# Patient Record
Sex: FEMALE | Race: BLACK OR AFRICAN AMERICAN | NOT HISPANIC OR LATINO
[De-identification: names, ages, dates, MRNs, and addresses within clinical notes are randomized per-mention and may not be internally consistent; named-entity substitution may affect disease eponyms.]

---

## 2017-02-08 ENCOUNTER — APPOINTMENT (OUTPATIENT)
Dept: ENDOCRINOLOGY | Facility: CLINIC | Age: 62
End: 2017-02-08

## 2017-02-08 VITALS
DIASTOLIC BLOOD PRESSURE: 80 MMHG | BODY MASS INDEX: 40.33 KG/M2 | SYSTOLIC BLOOD PRESSURE: 120 MMHG | WEIGHT: 260 LBS | HEIGHT: 67.5 IN | OXYGEN SATURATION: 98 % | HEART RATE: 67 BPM

## 2017-02-08 LAB — GLUCOSE BLDC GLUCOMTR-MCNC: 99

## 2017-03-27 ENCOUNTER — RX RENEWAL (OUTPATIENT)
Age: 62
End: 2017-03-27

## 2017-04-20 ENCOUNTER — RX RENEWAL (OUTPATIENT)
Age: 62
End: 2017-04-20

## 2017-07-03 ENCOUNTER — MEDICATION RENEWAL (OUTPATIENT)
Age: 62
End: 2017-07-03

## 2017-07-05 ENCOUNTER — APPOINTMENT (OUTPATIENT)
Dept: ENDOCRINOLOGY | Facility: CLINIC | Age: 62
End: 2017-07-05

## 2017-07-05 VITALS
HEIGHT: 67.5 IN | HEART RATE: 70 BPM | BODY MASS INDEX: 40.02 KG/M2 | WEIGHT: 258 LBS | SYSTOLIC BLOOD PRESSURE: 120 MMHG | OXYGEN SATURATION: 98 % | DIASTOLIC BLOOD PRESSURE: 80 MMHG

## 2017-07-05 LAB
GLUCOSE BLDC GLUCOMTR-MCNC: 138
HBA1C MFR BLD HPLC: 6.9

## 2017-07-06 LAB
25(OH)D3 SERPL-MCNC: 27.6 NG/ML
ALBUMIN SERPL ELPH-MCNC: 4.5 G/DL
ALP BLD-CCNC: 67 U/L
ALT SERPL-CCNC: 55 U/L
ANION GAP SERPL CALC-SCNC: 13 MMOL/L
AST SERPL-CCNC: 36 U/L
BILIRUB SERPL-MCNC: 0.2 MG/DL
BUN SERPL-MCNC: 13 MG/DL
CALCIUM SERPL-MCNC: 9.3 MG/DL
CHLORIDE SERPL-SCNC: 105 MMOL/L
CHOLEST SERPL-MCNC: 188 MG/DL
CHOLEST/HDLC SERPL: 3.4 RATIO
CO2 SERPL-SCNC: 23 MMOL/L
CREAT SERPL-MCNC: 0.92 MG/DL
CREAT SPEC-SCNC: 138 MG/DL
GLUCOSE SERPL-MCNC: 101 MG/DL
HDLC SERPL-MCNC: 56 MG/DL
LDLC SERPL CALC-MCNC: 90 MG/DL
MICROALBUMIN 24H UR DL<=1MG/L-MCNC: 0.6 MG/DL
MICROALBUMIN/CREAT 24H UR-RTO: 4 MG/G
POTASSIUM SERPL-SCNC: 4.5 MMOL/L
PROT SERPL-MCNC: 7.5 G/DL
SODIUM SERPL-SCNC: 141 MMOL/L
TRIGL SERPL-MCNC: 211 MG/DL
TSH SERPL-ACNC: 0.96 UIU/ML

## 2017-07-12 ENCOUNTER — MEDICATION RENEWAL (OUTPATIENT)
Age: 62
End: 2017-07-12

## 2017-07-18 ENCOUNTER — MEDICATION RENEWAL (OUTPATIENT)
Age: 62
End: 2017-07-18

## 2017-11-16 ENCOUNTER — MEDICATION RENEWAL (OUTPATIENT)
Age: 62
End: 2017-11-16

## 2017-11-17 ENCOUNTER — APPOINTMENT (OUTPATIENT)
Dept: ENDOCRINOLOGY | Facility: CLINIC | Age: 62
End: 2017-11-17
Payer: COMMERCIAL

## 2017-11-17 DIAGNOSIS — Z23 ENCOUNTER FOR IMMUNIZATION: ICD-10-CM

## 2017-11-17 PROCEDURE — 82962 GLUCOSE BLOOD TEST: CPT

## 2017-11-17 PROCEDURE — 83036 HEMOGLOBIN GLYCOSYLATED A1C: CPT | Mod: QW

## 2017-11-17 PROCEDURE — 90686 IIV4 VACC NO PRSV 0.5 ML IM: CPT

## 2017-11-17 PROCEDURE — 99213 OFFICE O/P EST LOW 20 MIN: CPT | Mod: 25

## 2017-11-17 PROCEDURE — G0008: CPT

## 2017-11-18 LAB
GLUCOSE BLDC GLUCOMTR-MCNC: 88
HBA1C MFR BLD HPLC: 6.6

## 2017-12-21 ENCOUNTER — RX RENEWAL (OUTPATIENT)
Age: 62
End: 2017-12-21

## 2017-12-24 ENCOUNTER — RX RENEWAL (OUTPATIENT)
Age: 62
End: 2017-12-24

## 2018-04-20 ENCOUNTER — APPOINTMENT (OUTPATIENT)
Dept: ENDOCRINOLOGY | Facility: CLINIC | Age: 63
End: 2018-04-20
Payer: COMMERCIAL

## 2018-04-20 VITALS
SYSTOLIC BLOOD PRESSURE: 120 MMHG | WEIGHT: 258 LBS | OXYGEN SATURATION: 98 % | HEART RATE: 62 BPM | BODY MASS INDEX: 40.02 KG/M2 | DIASTOLIC BLOOD PRESSURE: 80 MMHG | HEIGHT: 67.5 IN

## 2018-04-20 LAB
CREAT SPEC-SCNC: 35 MG/DL
HBA1C MFR BLD HPLC: 7.1
MICROALBUMIN 24H UR DL<=1MG/L-MCNC: <0.3 MG/DL
MICROALBUMIN/CREAT 24H UR-RTO: NORMAL

## 2018-04-20 PROCEDURE — 99213 OFFICE O/P EST LOW 20 MIN: CPT | Mod: 25

## 2018-04-20 PROCEDURE — 83036 HEMOGLOBIN GLYCOSYLATED A1C: CPT | Mod: QW

## 2018-05-17 ENCOUNTER — MEDICATION RENEWAL (OUTPATIENT)
Age: 63
End: 2018-05-17

## 2018-05-17 RX ORDER — BLOOD-GLUCOSE METER
EACH MISCELLANEOUS
Qty: 900 | Refills: 3 | Status: DISCONTINUED | COMMUNITY
Start: 2018-05-17 | End: 2018-05-17

## 2018-05-21 ENCOUNTER — APPOINTMENT (OUTPATIENT)
Dept: ENDOCRINOLOGY | Facility: CLINIC | Age: 63
End: 2018-05-21
Payer: COMMERCIAL

## 2018-05-21 VITALS — BODY MASS INDEX: 39.9 KG/M2 | WEIGHT: 258.6 LBS

## 2018-05-21 PROCEDURE — G0108 DIAB MANAGE TRN  PER INDIV: CPT

## 2018-06-19 ENCOUNTER — RX RENEWAL (OUTPATIENT)
Age: 63
End: 2018-06-19

## 2018-06-27 ENCOUNTER — APPOINTMENT (OUTPATIENT)
Dept: ENDOCRINOLOGY | Facility: CLINIC | Age: 63
End: 2018-06-27

## 2018-08-21 ENCOUNTER — APPOINTMENT (OUTPATIENT)
Dept: ENDOCRINOLOGY | Facility: CLINIC | Age: 63
End: 2018-08-21

## 2018-10-15 ENCOUNTER — APPOINTMENT (OUTPATIENT)
Dept: ENDOCRINOLOGY | Facility: CLINIC | Age: 63
End: 2018-10-15
Payer: COMMERCIAL

## 2018-10-15 VITALS
BODY MASS INDEX: 39.87 KG/M2 | OXYGEN SATURATION: 96 % | SYSTOLIC BLOOD PRESSURE: 140 MMHG | WEIGHT: 257 LBS | HEART RATE: 84 BPM | DIASTOLIC BLOOD PRESSURE: 70 MMHG | HEIGHT: 67.5 IN

## 2018-10-15 LAB
GLUCOSE BLDC GLUCOMTR-MCNC: 128
HBA1C MFR BLD HPLC: 6.7

## 2018-10-15 PROCEDURE — 99214 OFFICE O/P EST MOD 30 MIN: CPT | Mod: 25

## 2018-10-15 PROCEDURE — 82962 GLUCOSE BLOOD TEST: CPT

## 2018-10-15 PROCEDURE — 83036 HEMOGLOBIN GLYCOSYLATED A1C: CPT | Mod: QW

## 2018-10-17 LAB
ANION GAP SERPL CALC-SCNC: 13 MMOL/L
BUN SERPL-MCNC: 12 MG/DL
CALCIUM SERPL-MCNC: 10 MG/DL
CHLORIDE SERPL-SCNC: 106 MMOL/L
CHOLEST SERPL-MCNC: 179 MG/DL
CHOLEST/HDLC SERPL: 3.4 RATIO
CO2 SERPL-SCNC: 21 MMOL/L
CREAT SERPL-MCNC: 0.98 MG/DL
CREAT SPEC-SCNC: 181 MG/DL
GLUCOSE SERPL-MCNC: 101 MG/DL
HDLC SERPL-MCNC: 53 MG/DL
LDLC SERPL CALC-MCNC: 98 MG/DL
MICROALBUMIN 24H UR DL<=1MG/L-MCNC: 1.6 MG/DL
MICROALBUMIN/CREAT 24H UR-RTO: 9 MG/G
POTASSIUM SERPL-SCNC: 4 MMOL/L
SODIUM SERPL-SCNC: 140 MMOL/L
TRIGL SERPL-MCNC: 138 MG/DL

## 2018-10-18 ENCOUNTER — CLINICAL ADVICE (OUTPATIENT)
Age: 63
End: 2018-10-18

## 2019-02-04 ENCOUNTER — APPOINTMENT (OUTPATIENT)
Dept: ENDOCRINOLOGY | Facility: CLINIC | Age: 64
End: 2019-02-04
Payer: COMMERCIAL

## 2019-02-04 VITALS
WEIGHT: 255 LBS | SYSTOLIC BLOOD PRESSURE: 160 MMHG | HEART RATE: 90 BPM | HEIGHT: 67.5 IN | OXYGEN SATURATION: 98 % | BODY MASS INDEX: 39.56 KG/M2 | DIASTOLIC BLOOD PRESSURE: 74 MMHG

## 2019-02-04 LAB
GLUCOSE BLDC GLUCOMTR-MCNC: 86
HBA1C MFR BLD HPLC: 6.4

## 2019-02-04 PROCEDURE — 99214 OFFICE O/P EST MOD 30 MIN: CPT | Mod: 25

## 2019-02-04 PROCEDURE — 82962 GLUCOSE BLOOD TEST: CPT

## 2019-02-04 PROCEDURE — 83036 HEMOGLOBIN GLYCOSYLATED A1C: CPT | Mod: QW

## 2019-02-04 NOTE — ASSESSMENT
[FreeTextEntry1] : 63-year-old female with relatively well controlled type 2 diabetes and obesity\par Type 2 diabetes: Hemoglobin A1c was 6.4%. She does have improvement in HgA1C. \par \par -Increase her step count, calorie goal daily is 2991-6934 calories \par -Appears enthusiastic to lose the weight. \par -She can continue metformin 1000 mg twice a day er and trulicity 1.5 mg once a week..\par -Bloodwork on next visit \par \par HLD\par -LDL at goal previously \par \par Follow up in 4-5 months

## 2019-02-04 NOTE — REVIEW OF SYSTEMS
[Fatigue] : no fatigue [Decreased Appetite] : appetite not decreased [Recent Weight Gain (___ Lbs)] : no recent weight gain [Recent Weight Loss (___ Lbs)] : no recent weight loss [Visual Field Defect] : no visual field defect [Blurry Vision] : no blurred vision [Dry Eyes] : no dryness of the eyes [Eyes Itch] : no itching of the eyes [Dysphagia] : no dysphagia [Dysphonia] : no dysphonia [Chest Pain] : no chest pain [Palpitations] : no palpitations [Heart Rate Is Slow] : the heart rate was not slow [Heart Rate Is Fast] : the heart rate was not fast [Shortness Of Breath] : no shortness of breath [Wheezing] : no wheezing was heard [Cough] : no cough [SOB on Exertion] : no shortness of breath during exertion [Nausea] : no nausea [Vomiting] : no vomiting was observed [Constipation] : no constipation [Diarrhea] : no diarrhea [Polyuria] : no polyuria [Irregular Menses] : regular menses [Dysuria] : no dysuria [Incontinence] : no incontinence [Joint Pain] : no joint pain [Muscle Weakness] : no muscle weakness [Muscle Cramps] : no muscle cramps [Acanthosis] : no acanthosis  [Hirsutism] : no hirsutism [Headache] : no headaches [Tremors] : no tremors [Dizziness] : no dizziness [Pain/Numbness of Digits] : no pain/numbness of digits [Depression] : no depression [Anxiety] : no anxiety [Polydipsia] : no polydipsia [Galactorrhea] : no galactorrhea  [Cold Intolerance] : cold tolerant [Heat Intolerance] : heat tolerant [Easy Bleeding] : no ~M tendency for easy bleeding [Easy Bruising] : no tendency for easy bruising [Swelling] : no swelling

## 2019-02-04 NOTE — HISTORY OF PRESENT ILLNESS
[FreeTextEntry1] : 63-year-old female for follow up with her 2 diabetes. \par She was diagnosed in 2013\par \par -She saw eye doctor in jan 2018, no retinopathy, + glaucoma. \par -She works part-time teaches education \par -Patient reported that she has late night cravings and ends up eating ice cream. \par -She walks 8k-9k \par \par Lost 3 lbs since last time \par \par She reported that she is on a metamucil challenge. \par \par Diabetes Follow-up HPI\par \par CC\par Patient is here for f/u of diabetes management (Type 2).\par \par Last visit with us was:04/20/2018\par \par HPI:\par \par List Current Medications for Glycemic control and the doses:\par 1- Metformin 500 mg ( 2 tablets in am and 2 tablets in pm) \par 2- Trulicity 1.5 mcg once a week \par 3- \par \par SMBG (self monitored blood glucose) readings: \par - Name of glucometer: \par - How often does the patient check BG? once a day \par - Does the patient keep a log? \par \par If detailed record is available, what is the range of most of the BG readings?\par - Before Breakfast: 105-130s\par -Before lunch: \par \par Does patient get Hypoglycemic episodes? None \par \par Diabetic Complications: Is patient aware of having any of those complications?\par - Eyes: Retinopathy? None \par  	When was the last fully dilated eye exam? 01/2018\par - Feet: 	Neuropathy? None \par  	Foot Ulcers? None \par 	When was the last time patient saw a Podiatrist? Not in a while \par - Kidneys: Nephropathy? GFR of 77 \par \par \par Diet: review patient's diet: \par Breakfast: Eggs, apple, coffee, sandwich thins, peanut butter\par Lunch: Bagel, fruit ( 3 times a day), salad, grilled chicken, low fat cream cheese \par Dinner: meat, salad, rice ( small portion) \par Snacks: Trailmix bar ( night snack), fruit during the day \par Juice or soda: None \par Dessert: Ice cream \par \par \par Exercise: review patient exercise habits: 6-7 k \par \par Symptoms: Write any symptoms, concerns and issues bothering the patient which have not be addressed above: \par No blurry vision, no excessive thirst or urination \par  \par

## 2019-02-04 NOTE — PHYSICAL EXAM
[Alert] : alert [No Acute Distress] : no acute distress [Well Nourished] : well nourished [Well Developed] : well developed [Normal Sclera/Conjunctiva] : normal sclera/conjunctiva [PERRL] : pupils equal, round and reactive to light [EOMI] : extra ocular movement intact [No Proptosis] : no proptosis [Normal Outer Ear/Nose] : the ears and nose were normal in appearance [Normal TMs] : both tympanic membranes were normal [No Neck Mass] : no neck mass was observed [Supple] : the neck was supple [Thyroid Not Enlarged] : the thyroid was not enlarged [Normal Rate and Effort] : normal respiratory rhythm and effort [No Accessory Muscle Use] : no accessory muscle use [Clear to Auscultation] : lungs were clear to auscultation bilaterally [Normal Rate] : heart rate was normal  [Normal S1, S2] : normal S1 and S2 [Regular Rhythm] : with a regular rhythm [Normal Bowel Sounds] : normal bowel sounds [Not Tender] : non-tender [Soft] : abdomen soft [Not Distended] : not distended [Normal] : normal and non tender [No CVA Tenderness] : no ~M costovertebral angle tenderness [No Spinal Tenderness] : no spinal tenderness [Normal Gait] : normal gait [No Joint Swelling] : no joint swelling seen [No Clubbing, Cyanosis] : no clubbing  or cyanosis of the fingernails [Normal Strength/Tone] : muscle strength and tone were normal [No Rash] : no rash [Normal Reflexes] : deep tendon reflexes were 2+ and symmetric [No Motor Deficits] : the motor exam was normal [No Tremors] : no tremors [Oriented x3] : oriented to person, place, and time [Normal Insight/Judgement] : insight and judgment were intact [Normal Affect] : the affect was normal [Normal Mood] : the mood was normal [Kyphosis] : no kyphosis present [Scoliosis] : scoliosis not present [Foot Ulcers] : no foot ulcers [de-identified] : Foot exam: No ulcers, deformities or calluses. Normal monofilament testing B/L feet

## 2019-02-25 ENCOUNTER — OTHER (OUTPATIENT)
Age: 64
End: 2019-02-25

## 2019-02-28 ENCOUNTER — OTHER (OUTPATIENT)
Age: 64
End: 2019-02-28

## 2019-03-04 ENCOUNTER — OTHER (OUTPATIENT)
Age: 64
End: 2019-03-04

## 2019-04-25 ENCOUNTER — RX RENEWAL (OUTPATIENT)
Age: 64
End: 2019-04-25

## 2019-04-30 ENCOUNTER — OTHER (OUTPATIENT)
Age: 64
End: 2019-04-30

## 2019-06-26 ENCOUNTER — OTHER (OUTPATIENT)
Age: 64
End: 2019-06-26

## 2019-07-08 ENCOUNTER — APPOINTMENT (OUTPATIENT)
Dept: ENDOCRINOLOGY | Facility: CLINIC | Age: 64
End: 2019-07-08

## 2019-08-16 ENCOUNTER — APPOINTMENT (OUTPATIENT)
Dept: ENDOCRINOLOGY | Facility: CLINIC | Age: 64
End: 2019-08-16
Payer: MEDICARE

## 2019-08-16 VITALS
DIASTOLIC BLOOD PRESSURE: 90 MMHG | HEART RATE: 70 BPM | WEIGHT: 252 LBS | HEIGHT: 67.5 IN | OXYGEN SATURATION: 98 % | SYSTOLIC BLOOD PRESSURE: 120 MMHG | BODY MASS INDEX: 39.09 KG/M2

## 2019-08-16 PROCEDURE — 82962 GLUCOSE BLOOD TEST: CPT

## 2019-08-16 PROCEDURE — 83036 HEMOGLOBIN GLYCOSYLATED A1C: CPT | Mod: QW

## 2019-08-16 PROCEDURE — 99214 OFFICE O/P EST MOD 30 MIN: CPT

## 2019-08-16 NOTE — PHYSICAL EXAM
[Alert] : alert [Well Nourished] : well nourished [No Acute Distress] : no acute distress [Normal Sclera/Conjunctiva] : normal sclera/conjunctiva [Well Developed] : well developed [PERRL] : pupils equal, round and reactive to light [EOMI] : extra ocular movement intact [No Proptosis] : no proptosis [Normal TMs] : both tympanic membranes were normal [Normal Outer Ear/Nose] : the ears and nose were normal in appearance [Normal Hearing] : hearing was normal [No Neck Mass] : no neck mass was observed [Thyroid Not Enlarged] : the thyroid was not enlarged [Supple] : the neck was supple [No Respiratory Distress] : no respiratory distress [Clear to Auscultation] : lungs were clear to auscultation bilaterally [Normal Rate and Effort] : normal respiratory rhythm and effort [Normal Rate] : heart rate was normal  [Normal S1, S2] : normal S1 and S2 [Regular Rhythm] : with a regular rhythm [Normal Bowel Sounds] : normal bowel sounds [Soft] : abdomen soft [Not Tender] : non-tender [Not Distended] : not distended [No CVA Tenderness] : no ~M costovertebral angle tenderness [No Joint Swelling] : no joint swelling seen [Normal Gait] : normal gait [Normal Strength/Tone] : muscle strength and tone were normal [No Clubbing, Cyanosis] : no clubbing  or cyanosis of the fingernails [No Rash] : no rash [No Skin Lesions] : no skin lesions [No Motor Deficits] : the motor exam was normal [Normal Reflexes] : deep tendon reflexes were 2+ and symmetric [No Tremors] : no tremors [Oriented x3] : oriented to person, place, and time [Normal Affect] : the affect was normal [Normal Insight/Judgement] : insight and judgment were intact [Normal Mood] : the mood was normal

## 2019-08-17 NOTE — REVIEW OF SYSTEMS
[Fatigue] : no fatigue [Decreased Appetite] : appetite not decreased [Recent Weight Gain (___ Lbs)] : no recent weight gain [Recent Weight Loss (___ Lbs)] : no recent weight loss [Visual Field Defect] : no visual field defect [Blurry Vision] : no blurred vision [Eyes Itch] : no itching of the eyes [Dry Eyes] : no dryness of the eyes [Dysphagia] : no dysphagia [Dysphonia] : no dysphonia [Palpitations] : no palpitations [Chest Pain] : no chest pain [Heart Rate Is Slow] : the heart rate was not slow [Heart Rate Is Fast] : the heart rate was not fast [Shortness Of Breath] : no shortness of breath [Cough] : no cough [Wheezing] : no wheezing was heard [Nausea] : no nausea [Polyuria] : no polyuria [Vomiting] : no vomiting was observed [Irregular Menses] : regular menses [Joint Pain] : no joint pain [Muscle Weakness] : no muscle weakness [Joint Stiffness] : no joint stiffness [Muscle Cramps] : no muscle cramps [Hirsutism] : no hirsutism [Acanthosis] : no acanthosis  [Headache] : no headaches [Tremors] : no tremors [Depression] : no depression [Anxiety] : no anxiety [Galactorrhea] : no galactorrhea  [Polydipsia] : no polydipsia [Cold Intolerance] : cold tolerant [Heat Intolerance] : heat tolerant [Easy Bleeding] : no ~M tendency for easy bleeding [Easy Bruising] : no tendency for easy bruising [Swelling] : no swelling

## 2019-08-17 NOTE — HISTORY OF PRESENT ILLNESS
[FreeTextEntry1] : 64-year-old female for follow up with her 2 diabetes. \par She was diagnosed in 2013\par \par -She saw eye doctor in jan 2018, no retinopathy, + glaucoma. \par -She works part-time teaches education \par -Patient reported that she has late night cravings and ends up eating ice cream. \par -She walks 8k-9k \par \par She reported that she had a tooth infection and she is on antibiotics and scheduled for a root canal. \par \par Diabetes Follow-up HPI\par \par CC\par Patient is here for f/u of diabetes management (Type 2).\par \par Last visit with us was:04/20/2018\par \par HPI:\par \par List Current Medications for Glycemic control and the doses:\par 1- Metformin 500 mg ( 2 tablets in am and 2 tablets in pm) \par 2- Trulicity 1.5 mcg once a week \par 3- \par \par SMBG (self monitored blood glucose) readings: \par - Name of glucometer: \par - How often does the patient check BG? once a day \par - Does the patient keep a log? \par \par If detailed record is available, what is the range of most of the BG readings?\par - Before Breakfast: 100-120 \par \par Does patient get Hypoglycemic episodes? None \par \par Diabetic Complications: Is patient aware of having any of those complications?\par - Eyes: Retinopathy? None \par  	When was the last fully dilated eye exam? 08/2019\par - Feet: 	Neuropathy? None \par  	Foot Ulcers? None \par 	When was the last time patient saw a Podiatrist? Not in a while \par - Kidneys: Nephropathy? GFR of 77 \par \par \par Diet: review patient's diet: \par Breakfast: Eggs, apple, coffee, sandwich thins, peanut butter\par Lunch: Bagel, fruit ( 3 times a day), salad, grilled chicken, low fat cream cheese \par Dinner: meat, salad, rice ( small portion) \par Snacks: Trailmix bar ( night snack), fruit during the day \par Juice or soda: None \par Dessert: Ice cream \par \par \par Exercise: review patient exercise habits: 6-7 k \par \par Symptoms: Write any symptoms, concerns and issues bothering the patient which have not be addressed above: \par No blurry vision, no excessive thirst or urination \par

## 2019-08-17 NOTE — ASSESSMENT
[FreeTextEntry1] : 64-year-old female with relatively well controlled type 2 diabetes and obesity\par Type 2 diabetes: Hemoglobin A1c was 6.3%. She does have improvement in HgA1C. \par \par -Increase her step count, calorie goal daily is 8771-7232 calories \par -Appears enthusiastic to lose the weight. \par -She can continue metformin 1000 mg twice a day er and trulicity 1.5 mg once a week..\par -Bloodwork on next visit \par \par HLD\par -LDL at goal previously \par -Will check lipid panel today \par \par \par Follow up in 4-5 months. \par

## 2019-08-19 LAB
ANION GAP SERPL CALC-SCNC: 13 MMOL/L
BUN SERPL-MCNC: 10 MG/DL
CALCIUM SERPL-MCNC: 10.3 MG/DL
CHLORIDE SERPL-SCNC: 104 MMOL/L
CHOLEST SERPL-MCNC: 189 MG/DL
CHOLEST/HDLC SERPL: 3.6 RATIO
CO2 SERPL-SCNC: 24 MMOL/L
CREAT SERPL-MCNC: 0.96 MG/DL
CREAT SPEC-SCNC: 47 MG/DL
ESTIMATED AVERAGE GLUCOSE: 143 MG/DL
GLUCOSE BLDC GLUCOMTR-MCNC: 91
GLUCOSE SERPL-MCNC: 88 MG/DL
HBA1C MFR BLD HPLC: 6.3
HBA1C MFR BLD HPLC: 6.6 %
HDLC SERPL-MCNC: 53 MG/DL
LDLC SERPL CALC-MCNC: 94 MG/DL
MICROALBUMIN 24H UR DL<=1MG/L-MCNC: <1.2 MG/DL
MICROALBUMIN/CREAT 24H UR-RTO: NORMAL MG/G
POTASSIUM SERPL-SCNC: 4 MMOL/L
SODIUM SERPL-SCNC: 140 MMOL/L
T4 FREE SERPL-MCNC: 1.3 NG/DL
TRIGL SERPL-MCNC: 212 MG/DL
TSH SERPL-ACNC: 1.14 UIU/ML

## 2019-08-21 ENCOUNTER — CLINICAL ADVICE (OUTPATIENT)
Age: 64
End: 2019-08-21

## 2019-08-28 ENCOUNTER — OTHER (OUTPATIENT)
Age: 64
End: 2019-08-28

## 2019-10-28 ENCOUNTER — OTHER (OUTPATIENT)
Age: 64
End: 2019-10-28

## 2019-10-31 ENCOUNTER — OTHER (OUTPATIENT)
Age: 64
End: 2019-10-31

## 2020-01-13 ENCOUNTER — APPOINTMENT (OUTPATIENT)
Dept: ENDOCRINOLOGY | Facility: CLINIC | Age: 65
End: 2020-01-13
Payer: MEDICARE

## 2020-01-13 VITALS
DIASTOLIC BLOOD PRESSURE: 80 MMHG | BODY MASS INDEX: 39.4 KG/M2 | WEIGHT: 254 LBS | HEIGHT: 67.5 IN | HEART RATE: 71 BPM | OXYGEN SATURATION: 98 % | SYSTOLIC BLOOD PRESSURE: 110 MMHG

## 2020-01-13 PROCEDURE — 83036 HEMOGLOBIN GLYCOSYLATED A1C: CPT | Mod: QW

## 2020-01-13 PROCEDURE — 99214 OFFICE O/P EST MOD 30 MIN: CPT

## 2020-01-13 NOTE — HISTORY OF PRESENT ILLNESS
[FreeTextEntry1] : 64-year-old female for follow up with her 2 diabetes. \par She was diagnosed in 2013\par \par Diabetes Follow-up HPI\par \par CC\par Patient is here for f/u of diabetes management (Type 2).\par \par Last visit with us was:04/20/2018\par \par HPI:\par \par List Current Medications for Glycemic control and the doses:\par 1- Metformin 500 mg ( 2 tablets in am and 2 tablets in pm) \par 2- Trulicity 1.5 mcg once a week \par 3- \par \par SMBG (self monitored blood glucose) readings: \par - Name of glucometer: \par - How often does the patient check BG? once a day \par - Does the patient keep a log? \par \par If detailed record is available, what is the range of most of the BG readings?\par - Before Breakfast: 110-130\par \par Does patient get Hypoglycemic episodes? None \par \par Diabetic Complications: Is patient aware of having any of those complications?\par - Eyes: Retinopathy? None \par  	When was the last fully dilated eye exam? 10/2019\par - Feet: 	Neuropathy? None \par  	Foot Ulcers? None \par 	When was the last time patient saw a Podiatrist? Not in a while \par - Kidneys: Nephropathy? GFR of 77 \par \par \par Diet: review patient's diet: \par Breakfast: Eggs, apple, coffee, sandwich thins, peanut butter\par Lunch: Bagel, fruit ( 3 times a day), salad, grilled chicken, low fat cream cheese \par Dinner: meat, salad, rice ( small portion) \par Snacks: Trailmix bar ( night snack), fruit during the day \par Juice or soda: None \par Dessert: Ice cream \par \par \par Exercise: review patient exercise habits: 6-7 k \par \par Symptoms: Write any symptoms, concerns and issues bothering the patient which have not be addressed above: \par No blurry vision, no excessive thirst or urination \par

## 2020-01-13 NOTE — REVIEW OF SYSTEMS
[Fatigue] : no fatigue [Decreased Appetite] : appetite not decreased [Recent Weight Loss (___ Lbs)] : no recent weight loss [Recent Weight Gain (___ Lbs)] : no recent weight gain [Visual Field Defect] : no visual field defect [Blurry Vision] : no blurred vision [Dry Eyes] : no dryness of the eyes [Eyes Itch] : no itching of the eyes [Dysphagia] : no dysphagia [Dysphonia] : no dysphonia [Chest Pain] : no chest pain [Neck Pain] : no neck pain [Palpitations] : no palpitations [Heart Rate Is Slow] : the heart rate was not slow [Shortness Of Breath] : no shortness of breath [Heart Rate Is Fast] : the heart rate was not fast [Wheezing] : no wheezing was heard [Cough] : no cough [SOB on Exertion] : no shortness of breath during exertion [Nausea] : no nausea [Vomiting] : no vomiting was observed [Polyuria] : no polyuria [Constipation] : no constipation [Diarrhea] : no diarrhea [Irregular Menses] : regular menses [Joint Stiffness] : no joint stiffness [Joint Pain] : no joint pain [Muscle Weakness] : no muscle weakness [Acanthosis] : no acanthosis  [Muscle Cramps] : no muscle cramps [Hirsutism] : no hirsutism [Headache] : no headaches [Depression] : no depression [Tremors] : no tremors [Dizziness] : no dizziness [Anxiety] : no anxiety [Polydipsia] : no polydipsia [Galactorrhea] : no galactorrhea  [Cold Intolerance] : cold tolerant [Heat Intolerance] : heat tolerant [Easy Bleeding] : no ~M tendency for easy bleeding [Easy Bruising] : no tendency for easy bruising [Swelling] : no swelling

## 2020-01-13 NOTE — PHYSICAL EXAM
[Alert] : alert [No Acute Distress] : no acute distress [Well Nourished] : well nourished [Well Developed] : well developed [Normal Sclera/Conjunctiva] : normal sclera/conjunctiva [PERRL] : pupils equal, round and reactive to light [EOMI] : extra ocular movement intact [No Proptosis] : no proptosis [Normal TMs] : both tympanic membranes were normal [Normal Outer Ear/Nose] : the ears and nose were normal in appearance [Normal Hearing] : hearing was normal [No Neck Mass] : no neck mass was observed [Supple] : the neck was supple [Thyroid Not Enlarged] : the thyroid was not enlarged [No Respiratory Distress] : no respiratory distress [Normal Rate and Effort] : normal respiratory rhythm and effort [Clear to Auscultation] : lungs were clear to auscultation bilaterally [Normal Rate] : heart rate was normal  [Normal S1, S2] : normal S1 and S2 [Regular Rhythm] : with a regular rhythm [Normal Bowel Sounds] : normal bowel sounds [Not Tender] : non-tender [Not Distended] : not distended [Soft] : abdomen soft [No CVA Tenderness] : no ~M costovertebral angle tenderness [Normal Gait] : normal gait [No Joint Swelling] : no joint swelling seen [No Clubbing, Cyanosis] : no clubbing  or cyanosis of the fingernails [Normal Strength/Tone] : muscle strength and tone were normal [No Skin Lesions] : no skin lesions [No Rash] : no rash [Normal Reflexes] : deep tendon reflexes were 2+ and symmetric [No Motor Deficits] : the motor exam was normal [No Tremors] : no tremors [Oriented x3] : oriented to person, place, and time [Normal Affect] : the affect was normal [Normal Insight/Judgement] : insight and judgment were intact

## 2020-01-13 NOTE — ASSESSMENT
[FreeTextEntry1] : 64-year-old female with relatively well controlled type 2 diabetes and obesity\par \par Type 2 diabetes: Hemoglobin A1c is 6.4%\par \par -She can continue Metformin 1000 mg twice a day ER and Trulicity 1.5 mg once a week\par -Carb consistent diet \par -SMBGs 2-3 times per day \par \par \par HLD\par -LDL at goal previously \par -Will check lipid panel today \par \par \par Follow up in 4-5 months. \par

## 2020-01-24 LAB
ANION GAP SERPL CALC-SCNC: 18 MMOL/L
BUN SERPL-MCNC: 12 MG/DL
CALCIUM SERPL-MCNC: 10.2 MG/DL
CHLORIDE SERPL-SCNC: 103 MMOL/L
CHOLEST SERPL-MCNC: 194 MG/DL
CHOLEST/HDLC SERPL: 3.3 RATIO
CO2 SERPL-SCNC: 21 MMOL/L
CREAT SERPL-MCNC: 0.9 MG/DL
GLUCOSE SERPL-MCNC: 84 MG/DL
HBA1C MFR BLD HPLC: 6.4
HDLC SERPL-MCNC: 58 MG/DL
LDLC SERPL CALC-MCNC: 81 MG/DL
POTASSIUM SERPL-SCNC: 4.7 MMOL/L
SODIUM SERPL-SCNC: 142 MMOL/L
TRIGL SERPL-MCNC: 274 MG/DL

## 2020-05-22 ENCOUNTER — RX RENEWAL (OUTPATIENT)
Age: 65
End: 2020-05-22

## 2020-06-08 ENCOUNTER — APPOINTMENT (OUTPATIENT)
Dept: ENDOCRINOLOGY | Facility: CLINIC | Age: 65
End: 2020-06-08
Payer: MEDICARE

## 2020-06-08 VITALS
HEART RATE: 85 BPM | OXYGEN SATURATION: 98 % | WEIGHT: 260 LBS | HEIGHT: 67.5 IN | DIASTOLIC BLOOD PRESSURE: 80 MMHG | SYSTOLIC BLOOD PRESSURE: 120 MMHG | TEMPERATURE: 97.4 F | BODY MASS INDEX: 40.33 KG/M2

## 2020-06-08 DIAGNOSIS — Z00.00 ENCOUNTER FOR GENERAL ADULT MEDICAL EXAMINATION W/OUT ABNORMAL FINDINGS: ICD-10-CM

## 2020-06-08 LAB
GLUCOSE BLDC GLUCOMTR-MCNC: 103
HBA1C MFR BLD HPLC: 7.1

## 2020-06-08 PROCEDURE — 82962 GLUCOSE BLOOD TEST: CPT

## 2020-06-08 PROCEDURE — 83036 HEMOGLOBIN GLYCOSYLATED A1C: CPT | Mod: QW

## 2020-06-08 PROCEDURE — 99214 OFFICE O/P EST MOD 30 MIN: CPT | Mod: 25

## 2020-06-08 RX ORDER — SEMAGLUTIDE 1.34 MG/ML
2 INJECTION, SOLUTION SUBCUTANEOUS
Qty: 1 | Refills: 5 | Status: DISCONTINUED | COMMUNITY
Start: 2020-01-21 | End: 2020-06-08

## 2020-06-08 RX ORDER — ALCOHOL ANTISEPTIC PADS
PADS, MEDICATED (EA) TOPICAL
Qty: 1 | Refills: 5 | Status: ACTIVE | COMMUNITY
Start: 2020-06-08 | End: 1900-01-01

## 2020-06-08 RX ORDER — LANCING DEVICE
EACH MISCELLANEOUS
Qty: 4 | Refills: 10 | Status: ACTIVE | COMMUNITY
Start: 2020-06-08 | End: 1900-01-01

## 2020-06-08 RX ORDER — LANCING DEVICE
W/DEVICE EACH MISCELLANEOUS
Qty: 1 | Refills: 0 | Status: ACTIVE | COMMUNITY
Start: 2020-06-08 | End: 1900-01-01

## 2020-06-08 RX ORDER — CHOLECALCIFEROL (VITAMIN D3) 10(400)/ML
DROPS ORAL
Qty: 1 | Refills: 5 | Status: ACTIVE | COMMUNITY
Start: 2020-06-08 | End: 1900-01-01

## 2020-06-08 NOTE — HISTORY OF PRESENT ILLNESS
[FreeTextEntry1] : 64-year-old female for follow up with her 2 diabetes. \par She was diagnosed in 2013\par \par \par Increased nausea and diarrhea \par Diabetes Follow-up HPI\par \par CC\par Patient is here for f/u of diabetes management (Type 2).\par \par Last visit with us was:04/20/2018\par \par HPI:\par \par List Current Medications for Glycemic control and the doses:\par 1- Metformin 500 mg ( 2 tablets in am and 2 tablets in pm) \par 2- Trulicity 1.5 mcg once a week \par 3- \par \par SMBG (self monitored blood glucose) readings: \par - Name of glucometer: \par - How often does the patient check BG? once a day \par - Does the patient keep a log? \par \par If detailed record is available, what is the range of most of the BG readings?\par - Before Breakfast: 130-145\par \par Does patient get Hypoglycemic episodes? None \par \par Diabetic Complications: Is patient aware of having any of those complications?\par - Eyes: Retinopathy? None, History of cataract surgery \par  	When was the last fully dilated eye exam? 05/2020\par - Feet: 	Neuropathy? None \par  	Foot Ulcers? None \par 	When was the last time patient saw a Podiatrist? Not in a while \par - Kidneys: Nephropathy? GFR of 77 \par \par \par Diet: review patient's diet: \par Breakfast: Eggs, apple, coffee, sandwich thins, peanut butter\par Lunch: Bagel, fruit ( 3 times a day), salad, grilled chicken, low fat cream cheese \par Dinner: meat, salad, rice ( small portion) \par Snacks: Trailmix bar ( night snack), fruit during the day \par Juice or soda: None \par Dessert: Ice cream \par \par Snacks: Chips, Watermelon, Pineapple, Dole cups, blackberries\par Cookies \par \par Exercise: review patient exercise habits: 6-7 k \par \par Symptoms: Write any symptoms, concerns and issues bothering the patient which have not be addressed above: \par No blurry vision, no excessive thirst or urination \par

## 2020-06-08 NOTE — REVIEW OF SYSTEMS
[Fatigue] : no fatigue [Decreased Appetite] : appetite not decreased [Visual Field Defect] : no visual field defect [Recent Weight Gain (___ Lbs)] : recent weight gain: [unfilled] lbs [Dry Eyes] : no dryness [Dysphagia] : no dysphagia [Neck Pain] : no neck pain [Nasal Congestion] : no nasal congestion [Dysphonia] : no dysphonia [Chest Pain] : no chest pain [Slow Heart Rate] : heart rate is not slow [Palpitations] : no palpitations [Fast Heart Rate] : heart rate is not fast [Shortness Of Breath] : no shortness of breath [Cough] : no cough [Nausea] : no nausea [Constipation] : no constipation [Vomiting] : no vomiting [Diarrhea] : no diarrhea [Polyuria] : no polyuria [Joint Pain] : no joint pain [Irregular Menses] : regular menses [Muscle Weakness] : no muscle weakness [Acne] : no acne [Acanthosis] : no acanthosis  [Headaches] : no headaches [Tremors] : no tremors [Dizziness] : no dizziness [Depression] : no depression [Suicidal Ideation] : no suicidal ideation [Polydipsia] : no polydipsia [Easy Bleeding] : no ~M tendency for easy bleeding [Cold Intolerance] : no cold intolerance [Easy Bruising] : no tendency for easy bruising [Swelling] : no swelling

## 2020-06-08 NOTE — ASSESSMENT
[FreeTextEntry1] : 64-year-old female with relatively well controlled type 2 diabetes and obesity\par \par Type 2 diabetes: Hemoglobin A1c is 7.1%\par \par -She can continue Metformin 1000 mg twice a day ER and Trulicity 1.5 mg once a week\par -At this time advised to start cutting back on high carb snacks and initiate physical activity\par -Carb consistent diet \par -SMBGs 2-3 times per day \par \par \par HLD\par -LDL at goal previously \par -Will check lipid panel today \par \par Obesity\par -Continue with Trulicity \par Check BMP, lipid panel, microalb/cr ratio \par \par Follow up in 4-5 months. \par

## 2020-06-08 NOTE — PHYSICAL EXAM
[Alert] : alert [Well Nourished] : well nourished [No Acute Distress] : no acute distress [Normal Sclera/Conjunctiva] : normal sclera/conjunctiva [PERRL] : pupils equal, round and reactive to light [EOMI] : extra ocular movement intact [Normal Outer Ear/Nose] : the ears and nose were normal in appearance [Normal Hearing] : hearing was normal [Normal TMs] : both tympanic membranes were normal [No Neck Mass] : no neck mass was observed [Thyroid Not Enlarged] : the thyroid was not enlarged [No Thyroid Nodules] : no palpable thyroid nodules [No Respiratory Distress] : no respiratory distress [Normal S1, S2] : normal S1 and S2 [Clear to Auscultation] : lungs were clear to auscultation bilaterally [Normal Rate] : heart rate was normal [Regular Rhythm] : with a regular rhythm [Normal Bowel Sounds] : normal bowel sounds [Soft] : abdomen soft [Not Tender] : non-tender [Kyphosis] : no kyphosis present [Scoliosis] : no scoliosis [Normal Gait] : normal gait [Normal Strength/Tone] : muscle strength and tone were normal [No Joint Swelling] : no joint swelling seen [No Clubbing, Cyanosis] : no clubbing  or cyanosis of the fingernails [No Rash] : no rash [No Skin Lesions] : no skin lesions [Normal Reflexes] : deep tendon reflexes were 2+ and symmetric [No Motor Deficits] : the motor exam was normal [No Tremors] : no tremors [Oriented x3] : oriented to person, place, and time [Normal Affect] : the affect was normal [Normal Insight/Judgement] : insight and judgment were intact [Normal Mood] : the mood was normal

## 2020-06-09 LAB
ALBUMIN SERPL ELPH-MCNC: 5.1 G/DL
ALP BLD-CCNC: 72 U/L
ALT SERPL-CCNC: 60 U/L
ANION GAP SERPL CALC-SCNC: 13 MMOL/L
AST SERPL-CCNC: 46 U/L
BILIRUB SERPL-MCNC: 0.4 MG/DL
BUN SERPL-MCNC: 11 MG/DL
CALCIUM SERPL-MCNC: 10.4 MG/DL
CHLORIDE SERPL-SCNC: 105 MMOL/L
CHOLEST SERPL-MCNC: 200 MG/DL
CHOLEST/HDLC SERPL: 3.5 RATIO
CO2 SERPL-SCNC: 23 MMOL/L
CREAT SERPL-MCNC: 0.9 MG/DL
CREAT SPEC-SCNC: 17 MG/DL
GLUCOSE SERPL-MCNC: 90 MG/DL
HDLC SERPL-MCNC: 57 MG/DL
LDLC SERPL CALC-MCNC: 99 MG/DL
MICROALBUMIN 24H UR DL<=1MG/L-MCNC: <1.2 MG/DL
MICROALBUMIN/CREAT 24H UR-RTO: NORMAL MG/G
POTASSIUM SERPL-SCNC: 4.3 MMOL/L
PROT SERPL-MCNC: 7.6 G/DL
SODIUM SERPL-SCNC: 141 MMOL/L
T4 FREE SERPL-MCNC: 1.2 NG/DL
TRIGL SERPL-MCNC: 224 MG/DL
TSH SERPL-ACNC: 1.04 UIU/ML

## 2020-07-09 ENCOUNTER — RESULT REVIEW (OUTPATIENT)
Age: 65
End: 2020-07-09

## 2020-09-17 ENCOUNTER — OUTPATIENT (OUTPATIENT)
Dept: OUTPATIENT SERVICES | Facility: HOSPITAL | Age: 65
LOS: 1 days | End: 2020-09-17
Payer: MEDICARE

## 2020-09-17 ENCOUNTER — APPOINTMENT (OUTPATIENT)
Dept: MRI IMAGING | Facility: CLINIC | Age: 65
End: 2020-09-17

## 2020-09-17 DIAGNOSIS — Z00.8 ENCOUNTER FOR OTHER GENERAL EXAMINATION: ICD-10-CM

## 2020-09-17 PROCEDURE — A9585: CPT

## 2020-09-17 PROCEDURE — 72197 MRI PELVIS W/O & W/DYE: CPT

## 2020-09-17 PROCEDURE — 72197 MRI PELVIS W/O & W/DYE: CPT | Mod: 26

## 2021-01-08 ENCOUNTER — APPOINTMENT (OUTPATIENT)
Dept: ENDOCRINOLOGY | Facility: CLINIC | Age: 66
End: 2021-01-08

## 2021-04-02 ENCOUNTER — APPOINTMENT (OUTPATIENT)
Dept: ENDOCRINOLOGY | Facility: CLINIC | Age: 66
End: 2021-04-02
Payer: MEDICARE

## 2021-04-02 VITALS
HEART RATE: 78 BPM | BODY MASS INDEX: 38.58 KG/M2 | SYSTOLIC BLOOD PRESSURE: 137 MMHG | OXYGEN SATURATION: 92 % | TEMPERATURE: 97.4 F | DIASTOLIC BLOOD PRESSURE: 73 MMHG | WEIGHT: 250 LBS

## 2021-04-02 LAB
GLUCOSE BLDC GLUCOMTR-MCNC: 95
HBA1C MFR BLD HPLC: 8

## 2021-04-02 PROCEDURE — 83036 HEMOGLOBIN GLYCOSYLATED A1C: CPT | Mod: QW

## 2021-04-02 PROCEDURE — 99214 OFFICE O/P EST MOD 30 MIN: CPT | Mod: 25

## 2021-04-02 PROCEDURE — 82962 GLUCOSE BLOOD TEST: CPT

## 2021-04-02 NOTE — PHYSICAL EXAM
[Alert] : alert [Well Nourished] : well nourished [No Acute Distress] : no acute distress [Normal Sclera/Conjunctiva] : normal sclera/conjunctiva [EOMI] : extra ocular movement intact [PERRL] : pupils equal, round and reactive to light [Normal Outer Ear/Nose] : the ears and nose were normal in appearance [Normal Hearing] : hearing was normal [Normal TMs] : both tympanic membranes were normal [No Neck Mass] : no neck mass was observed [Thyroid Not Enlarged] : the thyroid was not enlarged [No Respiratory Distress] : no respiratory distress [Clear to Auscultation] : lungs were clear to auscultation bilaterally [Normal S1, S2] : normal S1 and S2 [Normal Rate] : heart rate was normal [Normal Bowel Sounds] : normal bowel sounds [Not Tender] : non-tender [Soft] : abdomen soft [Normal Gait] : normal gait [No Clubbing, Cyanosis] : no clubbing  or cyanosis of the fingernails [No Joint Swelling] : no joint swelling seen [No Rash] : no rash [No Skin Lesions] : no skin lesions [No Motor Deficits] : the motor exam was normal [Normal Reflexes] : deep tendon reflexes were 2+ and symmetric [No Tremors] : no tremors [Oriented x3] : oriented to person, place, and time [Normal Affect] : the affect was normal [Normal Insight/Judgement] : insight and judgment were intact [Normal Mood] : the mood was normal [Right Foot Was Examined] : right foot ~C was examined [Left Foot Was Examined] : left foot ~C was examined [Normal] : normal

## 2021-04-02 NOTE — HISTORY OF PRESENT ILLNESS
[FreeTextEntry1] : 65-year-old female here for follow up with Type 2 diabetes. \par She was diagnosed in 2013\par \par New PCP: Dr. Tasha Em \par Diabetes Follow-up HPI\par \par CC\par Patient is here for f/u of diabetes management (Type 2).\par \par Last visit with us was:04/20/2018\par \par HPI:\par \par List Current Medications for Glycemic control and the doses:\par 1- Metformin 500 mg ( 2 tablets in am and 2 tablets in pm) \par 2- Trulicity 1.5 mcg once a week \par 3- \par \par SMBG (self monitored blood glucose) readings: \par - Name of glucometer: \par - How often does the patient check BG? once a day \par - Does the patient keep a log? \par \par If detailed record is available, what is the range of most of the BG readings?\par - Before Breakfast: 121-130\par Afternoon:  \par \par Does patient get Hypoglycemic episodes? None \par \par Diabetic Complications: Is patient aware of having any of those complications?\par - Eyes: Retinopathy? None, History of cataract surgery \par  	When was the last fully dilated eye exam? 01/2021\par - Feet: 	Neuropathy? None \par  	Foot Ulcers? None \par 	When was the last time patient saw a Podiatrist? Not in a while \par - Kidneys: Nephropathy? GFR of 77 \par \par \par Diet: review patient's diet: \par Breakfast: Eggs, apple, coffee, sandwich thins, peanut butter\par Lunch: Bagel, fruit ( 3 times a day), salad, grilled chicken, low fat cream cheese \par Dinner: meat, salad, rice ( small portion) \par Snacks: Trailmix bar ( night snack), fruit during the day \par Juice or soda: None \par Dessert: Ice cream \par She has been consuming more vegetables \par \par Snacks: Chips, Watermelon, Pineapple, Dole cups, blackberries\par Cookies \par \par Exercise: review patient exercise habits: 6-7 k \par \par Symptoms: Write any symptoms, concerns and issues bothering the patient which have not be addressed above: \par No blurry vision, no excessive thirst or urination \par

## 2021-04-02 NOTE — REVIEW OF SYSTEMS
[Fatigue] : no fatigue [Decreased Appetite] : appetite not decreased [Recent Weight Gain (___ Lbs)] : no recent weight gain [Recent Weight Loss (___ Lbs)] : no recent weight loss [Visual Field Defect] : no visual field defect [Dry Eyes] : no dryness [Dysphagia] : no dysphagia [Neck Pain] : no neck pain [Dysphonia] : no dysphonia [Nasal Congestion] : no nasal congestion [Chest Pain] : no chest pain [Slow Heart Rate] : heart rate is not slow [Palpitations] : no palpitations [Fast Heart Rate] : heart rate is not fast [Shortness Of Breath] : no shortness of breath [Cough] : no cough [Nausea] : no nausea [Vomiting] : no vomiting [Polyuria] : no polyuria [Irregular Menses] : regular menses [Joint Pain] : no joint pain [Muscle Weakness] : no muscle weakness [Acanthosis] : no acanthosis  [Acne] : no acne [Tremors] : no tremors [Headaches] : no headaches [Depression] : no depression [Polydipsia] : no polydipsia [Cold Intolerance] : no cold intolerance [Easy Bleeding] : no ~M tendency for easy bleeding [Easy Bruising] : no tendency for easy bruising

## 2021-04-02 NOTE — ASSESSMENT
[FreeTextEntry1] : 65-year-old female with relatively well controlled type 2 diabetes and obesity\par \par Type 2 diabetes: Hemoglobin A1c is 8.0%\par \par -She can continue Metformin 1000 mg twice a day ER and Trulicity 1.5 mg once a week\par -At this time advised to start cutting back on high carb snacks and initiate physical activity, if no improvement by next visit may consider addition of jardiance \par -Carb consistent diet \par -SMBGs 2-3 times per day \par \par HLD\par -LDL at goal previously \par -Will check lipid panel today \par \par Obesity\par -Continue with Trulicity \par \par -Check BMP, lipid panel, microalb/cr ratio \par \par Follow up in 4-5 months. \par  \par \par

## 2021-04-04 LAB
ALBUMIN SERPL ELPH-MCNC: 5 G/DL
ALP BLD-CCNC: 90 U/L
ALT SERPL-CCNC: 67 U/L
ANION GAP SERPL CALC-SCNC: 16 MMOL/L
AST SERPL-CCNC: 47 U/L
BILIRUB SERPL-MCNC: 0.2 MG/DL
BUN SERPL-MCNC: 12 MG/DL
CALCIUM SERPL-MCNC: 10.2 MG/DL
CHLORIDE SERPL-SCNC: 103 MMOL/L
CHOLEST SERPL-MCNC: 196 MG/DL
CO2 SERPL-SCNC: 22 MMOL/L
CREAT SERPL-MCNC: 0.9 MG/DL
CREAT SPEC-SCNC: 41 MG/DL
GLUCOSE SERPL-MCNC: 92 MG/DL
HDLC SERPL-MCNC: 58 MG/DL
LDLC SERPL CALC-MCNC: 96 MG/DL
MICROALBUMIN 24H UR DL<=1MG/L-MCNC: <1.2 MG/DL
MICROALBUMIN/CREAT 24H UR-RTO: NORMAL MG/G
NONHDLC SERPL-MCNC: 138 MG/DL
POTASSIUM SERPL-SCNC: 4.4 MMOL/L
PROT SERPL-MCNC: 8.1 G/DL
SODIUM SERPL-SCNC: 141 MMOL/L
TRIGL SERPL-MCNC: 213 MG/DL

## 2021-04-05 RX ORDER — BLOOD-GLUCOSE METER
EACH MISCELLANEOUS
Qty: 400 | Refills: 3 | Status: COMPLETED | COMMUNITY
Start: 2018-05-17 | End: 2021-04-05

## 2021-04-20 ENCOUNTER — RX RENEWAL (OUTPATIENT)
Age: 66
End: 2021-04-20

## 2021-05-17 ENCOUNTER — APPOINTMENT (OUTPATIENT)
Dept: ENDOCRINOLOGY | Facility: CLINIC | Age: 66
End: 2021-05-17
Payer: MEDICARE

## 2021-05-17 VITALS — WEIGHT: 259 LBS | BODY MASS INDEX: 39.97 KG/M2

## 2021-05-17 PROCEDURE — G0108 DIAB MANAGE TRN  PER INDIV: CPT

## 2021-06-15 ENCOUNTER — RX RENEWAL (OUTPATIENT)
Age: 66
End: 2021-06-15

## 2021-08-30 ENCOUNTER — APPOINTMENT (OUTPATIENT)
Dept: ENDOCRINOLOGY | Facility: CLINIC | Age: 66
End: 2021-08-30
Payer: MEDICARE

## 2021-08-30 VITALS
HEART RATE: 74 BPM | BODY MASS INDEX: 38.78 KG/M2 | SYSTOLIC BLOOD PRESSURE: 138 MMHG | WEIGHT: 250 LBS | OXYGEN SATURATION: 98 % | RESPIRATION RATE: 16 BRPM | HEIGHT: 67.5 IN | DIASTOLIC BLOOD PRESSURE: 74 MMHG | TEMPERATURE: 97.2 F

## 2021-08-30 LAB — HBA1C MFR BLD HPLC: 7.1

## 2021-08-30 PROCEDURE — 99214 OFFICE O/P EST MOD 30 MIN: CPT | Mod: 25

## 2021-08-30 PROCEDURE — 83036 HEMOGLOBIN GLYCOSYLATED A1C: CPT | Mod: QW

## 2021-08-30 NOTE — HISTORY OF PRESENT ILLNESS
[FreeTextEntry1] : 66-year-old female here for follow up with Type 2 diabetes. \par She was diagnosed in 2013\par \par New PCP: Dr. Tasha Em \par Diabetes Follow-up HPI\par \par CC\par Patient is here for f/u of diabetes management (Type 2).\par \par Last visit with us was:04/20/2018\par \par HPI:\par \par List Current Medications for Glycemic control and the doses:\par 1- Metformin 500 mg ( 2 tablets in am and 2 tablets in pm) \par 2- Trulicity 1.5 mcg once a week \par 3- \par \par SMBG (self monitored blood glucose) readings: \par - Name of glucometer: \par - How often does the patient check BG? once a day \par - Does the patient keep a log? \par \par If detailed record is available, what is the range of most of the BG readings?\par - Before Breakfast: 125-130\par \par \par Does patient get Hypoglycemic episodes? None \par \par Diabetic Complications: Is patient aware of having any of those complications?\par - Eyes: Retinopathy? None, History of cataract surgery \par  	When was the last fully dilated eye exam? 06/2021\par - Feet: 	Neuropathy? None \par  	Foot Ulcers? None \par 	When was the last time patient saw a Podiatrist? Not in a while \par - Kidneys: Nephropathy? GFR of 77 \par \par Dental appointment last week \par \par \par Diet: review patient's diet: \par Breakfast: Eggs, apple, coffee, sandwich thins, peanut butter\par Lunch: Bagel, fruit ( 3 times a day), salad, grilled chicken, low fat cream cheese \par Dinner: meat, salad, rice ( small portion) \par Snacks: Trailmix bar ( night snack), fruit during the day \par Juice or soda: None \par Dessert: Ice cream \par She has been consuming more vegetables \par \par Snacks: Chips, Watermelon, Pineapple, Dole cups, blackberries\par Cookies \par \par Exercise: review patient exercise habits: 6-7 k \par \par Symptoms: Write any symptoms, concerns and issues bothering the patient which have not be addressed above: \par No blurry vision, no excessive thirst or urination \par \par

## 2021-08-30 NOTE — REVIEW OF SYSTEMS
[Fatigue] : no fatigue [Decreased Appetite] : appetite not decreased [Recent Weight Gain (___ Lbs)] : no recent weight gain [Recent Weight Loss (___ Lbs)] : no recent weight loss [Visual Field Defect] : no visual field defect [Dry Eyes] : no dryness [Dysphagia] : no dysphagia [Neck Pain] : no neck pain [Dysphonia] : no dysphonia [Nasal Congestion] : no nasal congestion [Chest Pain] : no chest pain [Palpitations] : no palpitations [Shortness Of Breath] : no shortness of breath [Nausea] : no nausea [Constipation] : no constipation [Vomiting] : no vomiting [Diarrhea] : no diarrhea [Polyuria] : no polyuria [Irregular Menses] : regular menses [Joint Pain] : no joint pain [Muscle Weakness] : no muscle weakness [Acanthosis] : no acanthosis  [Acne] : no acne [Headaches] : no headaches [Dizziness] : no dizziness [Tremors] : no tremors [Pain/Numbness of Digits] : no pain/numbness of digits [Depression] : no depression [Polydipsia] : no polydipsia [Cold Intolerance] : no cold intolerance [Easy Bleeding] : no ~M tendency for easy bleeding [Easy Bruising] : no tendency for easy bruising

## 2021-08-30 NOTE — ASSESSMENT
[FreeTextEntry1] : 65-year-old female with relatively well controlled type 2 diabetes and obesity\par \par Type 2 diabetes: Hemoglobin A1c is 7.1%\par \par -She can continue Metformin 1000 mg twice a day ER and Trulicity 1.5 mg once a week\par -Applauded for her efforts with her diet and lifestyle \par -Carb consistent diet \par -SMBGs 2-3 times per day \par \par HLD\par -LDL at goal previously \par -Will check lipid panel today \par \par Obesity\par -Continue with Trulicity \par \par -Check BMP, lipid panel, microalb/cr ratio \par \par Follow up in 4-5 months. \par

## 2022-01-24 ENCOUNTER — APPOINTMENT (OUTPATIENT)
Dept: ENDOCRINOLOGY | Facility: CLINIC | Age: 67
End: 2022-01-24
Payer: MEDICARE

## 2022-01-24 VITALS
BODY MASS INDEX: 39.71 KG/M2 | TEMPERATURE: 97.7 F | HEIGHT: 67 IN | OXYGEN SATURATION: 99 % | WEIGHT: 253 LBS | DIASTOLIC BLOOD PRESSURE: 70 MMHG | SYSTOLIC BLOOD PRESSURE: 138 MMHG | HEART RATE: 77 BPM

## 2022-01-24 LAB
GLUCOSE BLDC GLUCOMTR-MCNC: 88
HBA1C MFR BLD HPLC: 7.1

## 2022-01-24 PROCEDURE — 83036 HEMOGLOBIN GLYCOSYLATED A1C: CPT | Mod: QW

## 2022-01-24 PROCEDURE — 82962 GLUCOSE BLOOD TEST: CPT

## 2022-01-24 PROCEDURE — 99214 OFFICE O/P EST MOD 30 MIN: CPT | Mod: 25

## 2022-01-24 NOTE — HISTORY OF PRESENT ILLNESS
[FreeTextEntry1] : 66-year-old female here for follow up with Type 2 diabetes. \par She was diagnosed in 2013\par \par New PCP: Dr. Tasha Em \par Diabetes Follow-up HPI\par \par CC\par Patient is here for f/u of diabetes management (Type 2).\par \par Last visit with us was:04/20/2018\par \par HPI:\par \par List Current Medications for Glycemic control and the doses:\par 1- Metformin 500 mg ( 2 tablets in am and 2 tablets in pm) \par 2- Trulicity 1.5 mcg once a week \par 3- \par \par SMBG (self monitored blood glucose) readings: \par - Name of glucometer: \par - How often does the patient check BG? once a day \par - Does the patient keep a log? \par \par If detailed record is available, what is the range of most of the BG readings?\par - Before Breakfast: 130s\par -Before lunch: 90\par \par \par Does patient get Hypoglycemic episodes? None \par \par Diabetic Complications: Is patient aware of having any of those complications?\par - Eyes: Retinopathy? None, History of cataract surgery \par  	When was the last fully dilated eye exam? 12/2021\par - Feet: 	Neuropathy? None \par  	Foot Ulcers? None \par 	When was the last time patient saw a Podiatrist? Not in a while \par - Kidneys: Nephropathy? GFR of 77 \par \par Dental appointment last week \par \par \par Diet: review patient's diet: \par Breakfast: Eggs, apple, coffee, sandwich thins, peanut butter\par Lunch: Bagel, fruit ( 3 times a day), salad, grilled chicken, low fat cream cheese \par Dinner: meat, salad, rice ( small portion) \par Snacks: Trailmix bar ( night snack), fruit during the day \par Juice or soda: None \par Dessert: Ice cream \par She has been consuming more vegetables \par \par Snacks: Chips, Watermelon, Pineapple, Dole cups, blackberries\par Cookies \par \par Exercise: review patient exercise habits: Has been doing the stationary bike x few times per week \par \par Symptoms: Write any symptoms, concerns and issues bothering the patient which have not be addressed above: \par No blurry vision, no excessive thirst or urination \par \par

## 2022-01-24 NOTE — PHYSICAL EXAM
[Alert] : alert [Well Nourished] : well nourished [No Acute Distress] : no acute distress [Normal Sclera/Conjunctiva] : normal sclera/conjunctiva [EOMI] : extra ocular movement intact [PERRL] : pupils equal, round and reactive to light [Normal Outer Ear/Nose] : the ears and nose were normal in appearance [Normal Hearing] : hearing was normal [Normal TMs] : both tympanic membranes were normal [No Neck Mass] : no neck mass was observed [Thyroid Not Enlarged] : the thyroid was not enlarged [No Respiratory Distress] : no respiratory distress [Clear to Auscultation] : lungs were clear to auscultation bilaterally [Normal S1, S2] : normal S1 and S2 [Normal Rate] : heart rate was normal [Regular Rhythm] : with a regular rhythm [Normal Bowel Sounds] : normal bowel sounds [Not Tender] : non-tender [Soft] : abdomen soft [No Joint Swelling] : no joint swelling seen [No Rash] : no rash [No Skin Lesions] : no skin lesions [No Motor Deficits] : the motor exam was normal [Normal Reflexes] : deep tendon reflexes were 2+ and symmetric [No Tremors] : no tremors [Oriented x3] : oriented to person, place, and time [Normal Affect] : the affect was normal [Normal Insight/Judgement] : insight and judgment were intact [Normal Mood] : the mood was normal

## 2022-01-24 NOTE — REVIEW OF SYSTEMS
[Fatigue] : no fatigue [Decreased Appetite] : appetite not decreased [Recent Weight Gain (___ Lbs)] : no recent weight gain [Recent Weight Loss (___ Lbs)] : no recent weight loss [Visual Field Defect] : no visual field defect [Dry Eyes] : no dryness [Dysphagia] : no dysphagia [Neck Pain] : no neck pain [Dysphonia] : no dysphonia [Nasal Congestion] : no nasal congestion [Chest Pain] : no chest pain [Slow Heart Rate] : heart rate is not slow [Palpitations] : no palpitations [Fast Heart Rate] : heart rate is not fast [Shortness Of Breath] : no shortness of breath [Cough] : no cough [Nausea] : no nausea [Constipation] : no constipation [Vomiting] : no vomiting [Diarrhea] : no diarrhea [Polyuria] : no polyuria [Irregular Menses] : regular menses [Joint Pain] : no joint pain [Muscle Weakness] : no muscle weakness [Acanthosis] : no acanthosis  [Acne] : no acne

## 2022-01-31 LAB
ALBUMIN SERPL ELPH-MCNC: 5.1 G/DL
ALP BLD-CCNC: 76 U/L
ALT SERPL-CCNC: 41 U/L
ANION GAP SERPL CALC-SCNC: 14 MMOL/L
AST SERPL-CCNC: 28 U/L
BILIRUB SERPL-MCNC: 0.2 MG/DL
BUN SERPL-MCNC: 13 MG/DL
CALCIUM SERPL-MCNC: 10.2 MG/DL
CHLORIDE SERPL-SCNC: 105 MMOL/L
CHOLEST SERPL-MCNC: 218 MG/DL
CO2 SERPL-SCNC: 23 MMOL/L
CREAT SERPL-MCNC: 0.85 MG/DL
CREAT SPEC-SCNC: 27 MG/DL
ESTIMATED AVERAGE GLUCOSE: 163 MG/DL
GLUCOSE SERPL-MCNC: 81 MG/DL
HBA1C MFR BLD HPLC: 7.3 %
HDLC SERPL-MCNC: 63 MG/DL
LDLC SERPL CALC-MCNC: 103 MG/DL
MICROALBUMIN 24H UR DL<=1MG/L-MCNC: <1.2 MG/DL
MICROALBUMIN/CREAT 24H UR-RTO: NORMAL MG/G
NONHDLC SERPL-MCNC: 155 MG/DL
POTASSIUM SERPL-SCNC: 4.2 MMOL/L
PROT SERPL-MCNC: 7.8 G/DL
SODIUM SERPL-SCNC: 141 MMOL/L
T4 FREE SERPL-MCNC: 1.3 NG/DL
TRIGL SERPL-MCNC: 258 MG/DL
TSH SERPL-ACNC: 1.38 UIU/ML

## 2022-03-22 ENCOUNTER — RX RENEWAL (OUTPATIENT)
Age: 67
End: 2022-03-22

## 2022-06-10 ENCOUNTER — RX RENEWAL (OUTPATIENT)
Age: 67
End: 2022-06-10

## 2022-09-08 ENCOUNTER — RX RENEWAL (OUTPATIENT)
Age: 67
End: 2022-09-08

## 2022-09-23 ENCOUNTER — APPOINTMENT (OUTPATIENT)
Dept: ENDOCRINOLOGY | Facility: CLINIC | Age: 67
End: 2022-09-23

## 2022-09-23 VITALS
OXYGEN SATURATION: 97 % | WEIGHT: 151 LBS | SYSTOLIC BLOOD PRESSURE: 126 MMHG | HEART RATE: 33 BPM | DIASTOLIC BLOOD PRESSURE: 78 MMHG | HEIGHT: 67 IN | TEMPERATURE: 96.7 F | BODY MASS INDEX: 23.7 KG/M2

## 2022-09-23 LAB
GLUCOSE BLDC GLUCOMTR-MCNC: 183
HBA1C MFR BLD HPLC: 7.2

## 2022-09-23 PROCEDURE — 83036 HEMOGLOBIN GLYCOSYLATED A1C: CPT | Mod: QW

## 2022-09-23 PROCEDURE — 99214 OFFICE O/P EST MOD 30 MIN: CPT | Mod: 25

## 2022-09-23 PROCEDURE — 82962 GLUCOSE BLOOD TEST: CPT

## 2022-09-23 NOTE — ASSESSMENT
[FreeTextEntry1] : 67-year-old female with relatively well controlled type 2 diabetes and obesity\par \par Type 2 diabetes: Hemoglobin A1c is 7.2\par \par -She can continue Metformin 1000 mg twice a day ER and will increase Trulicity to 3 mg once a week\par -Applauded for her efforts with her diet and lifestyle \par -Carb consistent diet \par -SMBGs 2-3 times per day \par \par HLD\par -LDL at goal previously \par -Will check lipid panel today \par \par Obesity\par -Continue with Trulicity 3 mg weekly \par \par -Check BMP, lipid panel, microalb/cr ratio \par \par Follow up in 4-5 months. \par

## 2022-09-23 NOTE — HISTORY OF PRESENT ILLNESS
[FreeTextEntry1] : 67-year-old female here for follow up with Type 2 diabetes. \par She was diagnosed in 2013\par \par She reported that she has been having Alopecia. and now on a steroid ointment \par \par \par New PCP: Dr. Tasha Em \par Diabetes Follow-up HPI\par \par CC\par Patient is here for f/u of diabetes management (Type 2).\par \par Last visit with us was:04/20/2018\par \par HPI:\par \par List Current Medications for Glycemic control and the doses:\par 1- Metformin 500 mg ( 2 tablets in am and 2 tablets in pm) \par 2- Trulicity 1.5 mcg once a week \par 3- \par \par SMBG (self monitored blood glucose) readings: \par - Name of glucometer: \par - How often does the patient check BG? once a day \par - Does the patient keep a log? \par \par If detailed record is available, what is the range of most of the BG readings?\par Before Dinner: 120-150 \par \par \par Does patient get Hypoglycemic episodes? None \par \par Diabetic Complications: Is patient aware of having any of those complications?\par - Eyes: Retinopathy? None, History of cataract surgery \par  	When was the last fully dilated eye exam? Last in 05/2022 and coming 11/2022 \par - Feet: 	Neuropathy? None \par  	Foot Ulcers? None \par 	When was the last time patient saw a Podiatrist? Not in a while \par - Kidneys: Nephropathy? GFR of 77 \par \par Dental appointment last week \par \par \par Diet: review patient's diet: \par Breakfast: Eggs, apple, coffee, sandwich thins, peanut butter\par Lunch: Bagel, fruit ( 3 times a day), salad, grilled chicken, low fat cream cheese \par Dinner: meat, salad, rice ( small portion) \par Snacks: Trailmix bar ( night snack), fruit during the day \par Juice or soda: None \par Dessert: Ice cream \par She has been consuming more vegetables \par \par Snacks: Chips, Watermelon, Pineapple, Dole cups, blackberries\par Cookies \par \par Exercise: review patient exercise habits: Has been doing the stationary bike x few times per week \par \par Symptoms: Write any symptoms, concerns and issues bothering the patient which have not be addressed above: \par No blurry vision, no excessive thirst or urination \par \par

## 2022-10-02 LAB
ALBUMIN SERPL ELPH-MCNC: 5 G/DL
ALP BLD-CCNC: 71 U/L
ALT SERPL-CCNC: 24 U/L
ANION GAP SERPL CALC-SCNC: 13 MMOL/L
AST SERPL-CCNC: 21 U/L
BILIRUB SERPL-MCNC: 0.2 MG/DL
BUN SERPL-MCNC: 16 MG/DL
CALCIUM SERPL-MCNC: 10.3 MG/DL
CHLORIDE SERPL-SCNC: 106 MMOL/L
CHOLEST SERPL-MCNC: 207 MG/DL
CO2 SERPL-SCNC: 22 MMOL/L
CREAT SERPL-MCNC: 0.88 MG/DL
CREAT SPEC-SCNC: 74 MG/DL
EGFR: 72 ML/MIN/1.73M2
ESTIMATED AVERAGE GLUCOSE: 180 MG/DL
GLUCOSE SERPL-MCNC: 146 MG/DL
HBA1C MFR BLD HPLC: 7.9 %
HDLC SERPL-MCNC: 56 MG/DL
LDLC SERPL CALC-MCNC: 94 MG/DL
MICROALBUMIN 24H UR DL<=1MG/L-MCNC: <1.2 MG/DL
MICROALBUMIN/CREAT 24H UR-RTO: NORMAL MG/G
NONHDLC SERPL-MCNC: 151 MG/DL
POTASSIUM SERPL-SCNC: 4.2 MMOL/L
PROT SERPL-MCNC: 7.4 G/DL
SODIUM SERPL-SCNC: 141 MMOL/L
TRIGL SERPL-MCNC: 285 MG/DL

## 2022-12-06 RX ORDER — BLOOD SUGAR DIAGNOSTIC
STRIP MISCELLANEOUS
Qty: 1 | Refills: 3 | Status: ACTIVE | COMMUNITY
Start: 2021-03-29 | End: 1900-01-01

## 2023-01-30 ENCOUNTER — APPOINTMENT (OUTPATIENT)
Dept: ENDOCRINOLOGY | Facility: CLINIC | Age: 68
End: 2023-01-30
Payer: MEDICARE

## 2023-01-30 VITALS — HEIGHT: 67 IN

## 2023-01-30 VITALS
HEIGHT: 67 IN | DIASTOLIC BLOOD PRESSURE: 86 MMHG | OXYGEN SATURATION: 96 % | BODY MASS INDEX: 38.92 KG/M2 | WEIGHT: 248 LBS | HEART RATE: 90 BPM | SYSTOLIC BLOOD PRESSURE: 120 MMHG

## 2023-01-30 LAB — HBA1C MFR BLD HPLC: 9.1

## 2023-01-30 PROCEDURE — 99214 OFFICE O/P EST MOD 30 MIN: CPT | Mod: 25

## 2023-01-30 PROCEDURE — 83036 HEMOGLOBIN GLYCOSYLATED A1C: CPT | Mod: QW

## 2023-01-30 RX ORDER — PEN NEEDLE, DIABETIC 33 GX5/32"
33G X 4 MM NEEDLE, DISPOSABLE MISCELLANEOUS
Qty: 1 | Refills: 1 | Status: ACTIVE | COMMUNITY
Start: 2023-01-30 | End: 1900-01-01

## 2023-01-30 NOTE — ASSESSMENT
[FreeTextEntry1] : 67-year-old female with relatively well controlled type 2 diabetes and obesity\par \par Type 2 diabetes: Hemoglobin A1c is 9.1% \par \par -She can continue Metformin 1000 mg twice a day ER \par -HgA1c increased due to inability to get her trulicity due to national back order\par -Will start Ozempic at this time \par -Applauded for her efforts with her diet and lifestyle \par -Carb consistent diet \par -SMBGs 2-3 times per day \par \par HLD\par -LDL at goal previously \par -Will check lipid panel today \par \par Obesity\par -Start Ozempic 0.25 mg and increase to 0.5 mg \par \par \par Follow up in 4 months. \par

## 2023-01-30 NOTE — HISTORY OF PRESENT ILLNESS
[FreeTextEntry1] : 67-year-old female here for follow up with Type 2 diabetes. \par She was diagnosed in 2013\par \par She reported that she has been having Alopecia. and now on a steroid ointment \par \par \par New PCP: Dr. Tasha Em \par Diabetes Follow-up HPI\par \par CC\par Patient is here for f/u of diabetes management (Type 2).\par \par Last visit with us was:04/20/2018\par \par HPI:\par \par List Current Medications for Glycemic control and the doses:\par 1- Metformin 500 mg ( 2 tablets in am and 2 tablets in pm) \par 2- Rybelsus 7 mg daily \par 3- \par \par SMBG (self monitored blood glucose) readings: \par - Name of glucometer: \par - How often does the patient check BG? once a day \par - Does the patient keep a log? \par \par If detailed record is available, what is the range of most of the BG readings?\par Before Dinner: 180-215\par \par \par Does patient get Hypoglycemic episodes? None \par \par Diabetic Complications: Is patient aware of having any of those complications?\par - Eyes: Retinopathy? None, History of cataract surgery \par  	When was the last fully dilated eye exam? 01/2023\par - Feet: 	Neuropathy? None \par  	Foot Ulcers? None \par 	When was the last time patient saw a Podiatrist? Not in a while \par - Kidneys: Nephropathy? GFR of 77 \par \par Dental appointment last week \par \par \par Diet: review patient's diet: \par Breakfast: Eggs, apple, coffee, sandwich thins, peanut butter\par Lunch: Bagel, fruit ( 3 times a day), salad, grilled chicken, low fat cream cheese \par Dinner: meat, salad, rice ( small portion) \par Snacks: Trailmix bar ( night snack), fruit during the day \par Juice or soda: None \par Dessert: Ice cream \par She has been consuming more vegetables \par \par Snacks: Chips, Watermelon, Pineapple, Dole cups, blackberries\par Cookies \par \par Has increased consumption of vegetables \par \par Exercise: review patient exercise habits: Has been doing the stationary bike x few times per week \par \par Symptoms: Write any symptoms, concerns and issues bothering the patient which have not be addressed above: \par No blurry vision, no excessive thirst or urination \par \par

## 2023-01-31 ENCOUNTER — RX RENEWAL (OUTPATIENT)
Age: 68
End: 2023-01-31

## 2023-01-31 LAB
CREAT SPEC-SCNC: 53 MG/DL
MICROALBUMIN 24H UR DL<=1MG/L-MCNC: 1.3 MG/DL
MICROALBUMIN/CREAT 24H UR-RTO: 24 MG/G

## 2023-03-13 ENCOUNTER — NON-APPOINTMENT (OUTPATIENT)
Age: 68
End: 2023-03-13

## 2023-04-07 ENCOUNTER — APPOINTMENT (OUTPATIENT)
Dept: ENDOCRINOLOGY | Facility: CLINIC | Age: 68
End: 2023-04-07
Payer: MEDICARE

## 2023-04-07 VITALS
OXYGEN SATURATION: 98 % | BODY MASS INDEX: 35.94 KG/M2 | HEIGHT: 67 IN | WEIGHT: 229 LBS | HEART RATE: 90 BPM | DIASTOLIC BLOOD PRESSURE: 80 MMHG | SYSTOLIC BLOOD PRESSURE: 140 MMHG

## 2023-04-07 LAB
GLUCOSE BLDC GLUCOMTR-MCNC: 293
HBA1C MFR BLD HPLC: 12

## 2023-04-07 PROCEDURE — 83036 HEMOGLOBIN GLYCOSYLATED A1C: CPT | Mod: QW

## 2023-04-07 PROCEDURE — 99215 OFFICE O/P EST HI 40 MIN: CPT

## 2023-04-07 PROCEDURE — 82962 GLUCOSE BLOOD TEST: CPT

## 2023-04-07 RX ORDER — FENOFIBRATE 160 MG/1
160 TABLET ORAL
Refills: 0 | Status: ACTIVE | COMMUNITY

## 2023-04-07 RX ORDER — SEMAGLUTIDE 1.34 MG/ML
2 INJECTION, SOLUTION SUBCUTANEOUS
Qty: 4 | Refills: 5 | Status: DISCONTINUED | COMMUNITY
Start: 2023-01-30 | End: 2023-04-07

## 2023-04-07 NOTE — PHYSICAL EXAM
[Alert] : alert [Well Nourished] : well nourished [No Acute Distress] : no acute distress [Normal Sclera/Conjunctiva] : normal sclera/conjunctiva [EOMI] : extra ocular movement intact [PERRL] : pupils equal, round and reactive to light [Normal Outer Ear/Nose] : the ears and nose were normal in appearance [Normal Hearing] : hearing was normal [Normal TMs] : both tympanic membranes were normal [No Neck Mass] : no neck mass was observed [Thyroid Not Enlarged] : the thyroid was not enlarged [No Respiratory Distress] : no respiratory distress [Clear to Auscultation] : lungs were clear to auscultation bilaterally [Normal S1, S2] : normal S1 and S2 [Normal Rate] : heart rate was normal [Regular Rhythm] : with a regular rhythm [Normal Bowel Sounds] : normal bowel sounds [Not Tender] : non-tender [Soft] : abdomen soft [Normal Gait] : normal gait [No Clubbing, Cyanosis] : no clubbing  or cyanosis of the fingernails [No Joint Swelling] : no joint swelling seen [Normal Strength/Tone] : muscle strength and tone were normal [No Rash] : no rash [No Skin Lesions] : no skin lesions [Normal Reflexes] : deep tendon reflexes were 2+ and symmetric [No Motor Deficits] : the motor exam was normal [No Tremors] : no tremors [Oriented x3] : oriented to person, place, and time [Normal Affect] : the affect was normal [Normal Insight/Judgement] : insight and judgment were intact [Normal Mood] : the mood was normal

## 2023-04-07 NOTE — HISTORY OF PRESENT ILLNESS
[FreeTextEntry1] : 67-year-old female here for follow up with Type 2 diabetes. \par She was diagnosed in 2013\par \par She reported that she has been having Alopecia. and now on a steroid ointment \par \par \par New PCP: Dr. Tasha Em \par Diabetes Follow-up HPI\par \par CC\par Patient is here for f/u of diabetes management (Type 2).\par \par Last visit with us was:04/20/2018\par \par HPI:\par \par List Current Medications for Glycemic control and the doses:\par 1- Metformin 500 mg ( 2 tablets in am and 2 tablets in pm) \par 2- Trulicity 3.0 mg weekly \par 3- \par \par SMBG (self monitored blood glucose) readings: \par - Name of glucometer: \par - How often does the patient check BG? once a day \par - Does the patient keep a log? \par \par If detailed record is available, what is the range of most of the BG readings?\par Before breakfast: 318-345\par \par \par Does patient get Hypoglycemic episodes? None \par \par Diabetic Complications: Is patient aware of having any of those complications?\par - Eyes: Retinopathy? None, History of cataract surgery \par  	When was the last fully dilated eye exam? 02/2023\par - Feet: 	Neuropathy? None \par  	Foot Ulcers? None \par 	When was the last time patient saw a Podiatrist? Not in a while \par - Kidneys: Nephropathy? GFR of 77 \par \par Dental appointment last week \par \par \par Diet: review patient's diet: \par Breakfast: Eggs, apple, coffee, sandwich thins, peanut butter\par Lunch: Bagel, fruit ( 3 times a day), salad, grilled chicken, low fat cream cheese \par Dinner: meat, salad, rice ( small portion) \par Snacks: Trailmix bar ( night snack), fruit during the day \par Juice or soda: None \par Dessert: Ice cream \par She has been consuming more vegetables \par \par Snacks: Chips, Watermelon, Pineapple, Dole cups, blackberries\par Cookies \par \par Has increased consumption of vegetables \par \par Exercise: review patient exercise habits: Has been doing the stationary bike x few times per week \par \par Symptoms: Write any symptoms, concerns and issues bothering the patient which have not be addressed above: \par No blurry vision, no excessive thirst or urination \par \par

## 2023-04-07 NOTE — ASSESSMENT
[FreeTextEntry1] : 67-year-old female with relatively well controlled type 2 diabetes and obesity\par \par Type 2 diabetes: Hemoglobin A1c is 12.0% \par \par -She can continue Metformin 1000 mg twice a day ER \par -Start Lantus 10 units for BG of <150 in am \par -Increase trulicity to 4.5 mg weekly \par -Applauded for her efforts with her diet and lifestyle \par -Carb consistent diet \par -SMBGs 2-3 times per day \par \par HLD\par -LDL at goal previously \par -Will check lipid panel today  \par \par Obesity\par -Increase Trulicity to 4.5 mg weekly \par \par \par F/u in 8 weeks with NP \par Follow up in 4 months. \par

## 2023-04-07 NOTE — REVIEW OF SYSTEMS
[Fatigue] : no fatigue [Decreased Appetite] : appetite not decreased [Visual Field Defect] : no visual field defect [Dysphagia] : no dysphagia [Neck Pain] : no neck pain [Dysphonia] : no dysphonia [Nasal Congestion] : no nasal congestion [Chest Pain] : no chest pain [Slow Heart Rate] : heart rate is not slow [Palpitations] : no palpitations [Fast Heart Rate] : heart rate is not fast [Shortness Of Breath] : no shortness of breath [Nausea] : no nausea [Constipation] : no constipation [Vomiting] : no vomiting [Diarrhea] : no diarrhea [Polyuria] : no polyuria [Irregular Menses] : regular menses [Joint Pain] : no joint pain [Muscle Weakness] : no muscle weakness [Acanthosis] : no acanthosis  [Acne] : no acne [Headaches] : no headaches [Dizziness] : no dizziness [Tremors] : no tremors [Pain/Numbness of Digits] : no pain/numbness of digits [Depression] : no depression [Polydipsia] : no polydipsia [Cold Intolerance] : no cold intolerance [Easy Bleeding] : no ~M tendency for easy bleeding [Easy Bruising] : no tendency for easy bruising

## 2023-04-09 LAB
ALBUMIN SERPL ELPH-MCNC: 5.3 G/DL
ALP BLD-CCNC: 90 U/L
ALT SERPL-CCNC: 13 U/L
ANION GAP SERPL CALC-SCNC: 20 MMOL/L
AST SERPL-CCNC: 15 U/L
BILIRUB SERPL-MCNC: 0.2 MG/DL
BUN SERPL-MCNC: 13 MG/DL
CALCIUM SERPL-MCNC: 10.9 MG/DL
CHLORIDE SERPL-SCNC: 102 MMOL/L
CHOLEST SERPL-MCNC: 224 MG/DL
CO2 SERPL-SCNC: 18 MMOL/L
CREAT SERPL-MCNC: 0.92 MG/DL
CREAT SPEC-SCNC: 163 MG/DL
EGFR: 68 ML/MIN/1.73M2
GLUCOSE SERPL-MCNC: 270 MG/DL
HDLC SERPL-MCNC: 55 MG/DL
LDLC SERPL CALC-MCNC: 117 MG/DL
MICROALBUMIN 24H UR DL<=1MG/L-MCNC: <1.2 MG/DL
MICROALBUMIN/CREAT 24H UR-RTO: NORMAL MG/G
NONHDLC SERPL-MCNC: 169 MG/DL
POTASSIUM SERPL-SCNC: 4.9 MMOL/L
PROT SERPL-MCNC: 8 G/DL
SODIUM SERPL-SCNC: 140 MMOL/L
T4 FREE SERPL-MCNC: 1.7 NG/DL
TRIGL SERPL-MCNC: 262 MG/DL
TSH SERPL-ACNC: 0.93 UIU/ML

## 2023-06-02 ENCOUNTER — APPOINTMENT (OUTPATIENT)
Dept: ENDOCRINOLOGY | Facility: CLINIC | Age: 68
End: 2023-06-02
Payer: MEDICARE

## 2023-06-02 VITALS
WEIGHT: 228 LBS | OXYGEN SATURATION: 98 % | BODY MASS INDEX: 35.79 KG/M2 | HEART RATE: 70 BPM | HEIGHT: 67 IN | DIASTOLIC BLOOD PRESSURE: 80 MMHG | SYSTOLIC BLOOD PRESSURE: 134 MMHG

## 2023-06-02 PROCEDURE — 99214 OFFICE O/P EST MOD 30 MIN: CPT

## 2023-06-02 RX ORDER — INSULIN GLARGINE 100 [IU]/ML
100 INJECTION, SOLUTION SUBCUTANEOUS
Qty: 3 | Refills: 0 | Status: DISCONTINUED | COMMUNITY
Start: 2023-04-07 | End: 2023-06-02

## 2023-06-02 RX ORDER — DULAGLUTIDE 1.5 MG/.5ML
1.5 INJECTION, SOLUTION SUBCUTANEOUS
Qty: 1 | Refills: 3 | Status: DISCONTINUED | COMMUNITY
Start: 2022-01-24 | End: 2023-06-02

## 2023-06-02 RX ORDER — DULAGLUTIDE 3 MG/.5ML
3 INJECTION, SOLUTION SUBCUTANEOUS
Qty: 3 | Refills: 2 | Status: DISCONTINUED | COMMUNITY
Start: 2022-01-24 | End: 2023-06-02

## 2023-06-02 RX ORDER — DULAGLUTIDE 3 MG/.5ML
3 INJECTION, SOLUTION SUBCUTANEOUS
Qty: 1 | Refills: 5 | Status: DISCONTINUED | COMMUNITY
Start: 2022-09-23 | End: 2023-06-02

## 2023-06-02 RX ORDER — ORAL SEMAGLUTIDE 7 MG/1
7 TABLET ORAL
Qty: 30 | Refills: 0 | Status: DISCONTINUED | COMMUNITY
Start: 2022-12-30 | End: 2023-06-02

## 2023-06-02 NOTE — ASSESSMENT
[FreeTextEntry1] : 67-year-old female with relatively well controlled type 2 diabetes and obesity\par \par Type 2 diabetes: Hemoglobin A1c is 12.0% \par Due for A1c 7/7/23. she will wait until August to do the A1c. \par \par Plan:\par -Metformin 1000 mg twice a day ER \par -Trulicity to 4.5 mg weekly \par -Stagger the readings AM fasting, 2 hours after eating. \par -Continue with diet and lfiestyle\par -Carb consistent diet \par -SMBGs 2-3 times per day \par --she is taking biotin, no longer taking one-a-day vitamin, taking Vitamin B12. \par \par HLD\par -, \par -Not on a statin. \par -Recheck fasting lipids in August\par \par Obesity\par -Continue Trulicity to 4.5 mg weekly \par \par Hypercalcemia:\par Repeat today. \par She is taking Vitamin D supplement.

## 2023-06-02 NOTE — HISTORY OF PRESENT ILLNESS
[FreeTextEntry1] : 67-year-old female here for follow up with Type 2 diabetes. \par She was diagnosed in \par \par She reported that she has been having Alopecia. and now on a steroid ointment \par Just finished her doctorate of education degree. \par \par New PCP: Dr. Tasha Em \par Patient is here for f/u of diabetes management (Type 2).\par \par Last visit with us was:2018\par \par Current Medications:\par 1- Metformin 500 mg ( 2 tablets in am and 2 tablets in pm) \par 2- Trulicity 4.5 mg weekly \par \par Previous Medications:\par -Start Lantus 10 units for BG of <150 in am -- Patient never started insulin. \par \par Glucose Monitoring: \par AM [phone]\par \par Diabetic Complications: \par - Eyes: Retinopathy? None, History of cataract surgery \par  	When was the last fully dilated eye exam? 2023\par - Feet: 	Neuropathy? None \par  	Foot Ulcers? None \par 	When was the last time patient saw a Podiatrist? Not in a while \par - Kidneys: Nephropathy? GFR of 77 \par \par Dental appointment last week \par \par Diet: review patient's diet: \par Breakfast: Eggs, apple, coffee, sandwich thins, peanut butter\par Lunch: Bagel, fruit ( 3 times a day), salad, grilled chicken, low fat cream cheese \par Dinner: meat, salad, rice ( small portion) \par Snacks: Trailmix bar ( night snack), fruit during the day \par Juice or soda: None \par Dessert: Ice cream \par She has been consuming more vegetables \par \par Snacks: Chips, Watermelon, Pineapple, Dole cups, blackberries\par \par Has increased consumption of vegetables \par \par Exercise: review patient exercise habits: Has been doing the stationary bike x few times per week. she is walking every day, doing sit ups. \par \par Symptoms: \par No blurry vision, no excessive thirst or urination

## 2023-06-05 LAB
25(OH)D3 SERPL-MCNC: 36.4 NG/ML
ALBUMIN SERPL ELPH-MCNC: 5.1 G/DL
ALP BLD-CCNC: 57 U/L
ALT SERPL-CCNC: 12 U/L
ANION GAP SERPL CALC-SCNC: 16 MMOL/L
AST SERPL-CCNC: 15 U/L
BILIRUB SERPL-MCNC: 0.2 MG/DL
BUN SERPL-MCNC: 17 MG/DL
CA-I SERPL-SCNC: 5.2 MG/DL
CALCIUM SERPL-MCNC: 10.3 MG/DL
CALCIUM SERPL-MCNC: 10.3 MG/DL
CHLORIDE SERPL-SCNC: 108 MMOL/L
CO2 SERPL-SCNC: 16 MMOL/L
CREAT SERPL-MCNC: 0.88 MG/DL
EGFR: 72 ML/MIN/1.73M2
GLUCOSE BLDC GLUCOMTR-MCNC: 176
GLUCOSE SERPL-MCNC: 123 MG/DL
PARATHYROID HORMONE INTACT: 16 PG/ML
POTASSIUM SERPL-SCNC: 4.2 MMOL/L
PROT SERPL-MCNC: 7.7 G/DL
SODIUM SERPL-SCNC: 140 MMOL/L

## 2023-08-14 ENCOUNTER — APPOINTMENT (OUTPATIENT)
Dept: ENDOCRINOLOGY | Facility: CLINIC | Age: 68
End: 2023-08-14
Payer: MEDICARE

## 2023-08-14 VITALS
DIASTOLIC BLOOD PRESSURE: 72 MMHG | SYSTOLIC BLOOD PRESSURE: 130 MMHG | HEART RATE: 88 BPM | BODY MASS INDEX: 37.04 KG/M2 | OXYGEN SATURATION: 98 % | HEIGHT: 67 IN | WEIGHT: 236 LBS

## 2023-08-14 PROCEDURE — 83036 HEMOGLOBIN GLYCOSYLATED A1C: CPT | Mod: QW

## 2023-08-14 PROCEDURE — 99214 OFFICE O/P EST MOD 30 MIN: CPT

## 2023-08-14 NOTE — REVIEW OF SYSTEMS
[Fatigue] : no fatigue [Decreased Appetite] : appetite not decreased [Recent Weight Gain (___ Lbs)] : no recent weight gain [Recent Weight Loss (___ Lbs)] : no recent weight loss [Visual Field Defect] : no visual field defect [Dry Eyes] : no dryness [Dysphagia] : no dysphagia [Neck Pain] : no neck pain [Dysphonia] : no dysphonia [Nasal Congestion] : no nasal congestion [Chest Pain] : no chest pain [Slow Heart Rate] : heart rate is not slow [Palpitations] : no palpitations [Fast Heart Rate] : heart rate is not fast [Shortness Of Breath] : no shortness of breath [Nausea] : no nausea [Constipation] : no constipation [Vomiting] : no vomiting [Diarrhea] : no diarrhea [Polyuria] : no polyuria [Irregular Menses] : regular menses [Joint Pain] : no joint pain [Muscle Weakness] : no muscle weakness [Acanthosis] : no acanthosis  [Acne] : no acne [Headaches] : no headaches [Dizziness] : no dizziness [Tremors] : no tremors [Pain/Numbness of Digits] : no pain/numbness of digits [Depression] : no depression [Polydipsia] : no polydipsia [Cold Intolerance] : no cold intolerance [Easy Bleeding] : no ~M tendency for easy bleeding [Easy Bruising] : no tendency for easy bruising

## 2023-08-14 NOTE — ASSESSMENT
[FreeTextEntry1] : 68-year-old female with relatively well controlled type 2 diabetes and obesity  Type 2 diabetes: Last Hemoglobin A1c is 12.0% and now down 7.5%  -She can continue Metformin 1000 mg twice a day ER  -Increase trulicity to 4.5 mg weekly  -Applauded for her efforts with her diet and lifestyle  -Carb consistent diet  -SMBGs 2-3 times per day   HLD -LDL at goal previously  -Will check lipid panel today    Obesity -Continue Trulicity to 4.5 mg weekly    Follow up in 4 months.

## 2023-08-14 NOTE — PHYSICAL EXAM
[Alert] : alert [Well Nourished] : well nourished [No Acute Distress] : no acute distress [Normal Sclera/Conjunctiva] : normal sclera/conjunctiva [EOMI] : extra ocular movement intact [PERRL] : pupils equal, round and reactive to light [Normal Outer Ear/Nose] : the ears and nose were normal in appearance [Normal Hearing] : hearing was normal [Normal TMs] : both tympanic membranes were normal [No Neck Mass] : no neck mass was observed [Thyroid Not Enlarged] : the thyroid was not enlarged [No Respiratory Distress] : no respiratory distress [Clear to Auscultation] : lungs were clear to auscultation bilaterally [Normal S1, S2] : normal S1 and S2 [Normal Rate] : heart rate was normal [Regular Rhythm] : with a regular rhythm [Normal Bowel Sounds] : normal bowel sounds [Not Tender] : non-tender [Soft] : abdomen soft [Normal Gait] : normal gait [No Clubbing, Cyanosis] : no clubbing  or cyanosis of the fingernails [No Joint Swelling] : no joint swelling seen [No Rash] : no rash [No Skin Lesions] : no skin lesions [No Motor Deficits] : the motor exam was normal [Normal Reflexes] : deep tendon reflexes were 2+ and symmetric [No Tremors] : no tremors [Oriented x3] : oriented to person, place, and time [Normal Affect] : the affect was normal [Normal Insight/Judgement] : insight and judgment were intact

## 2023-08-14 NOTE — HISTORY OF PRESENT ILLNESS
[FreeTextEntry1] : 67-year-old female here for follow up with Type 2 diabetes.  She was diagnosed in   She reported that she has been having Alopecia. and now on a steroid ointment    New PCP: Dr. Tasha Em  Diabetes Follow-up HPI  CC Patient is here for f/u of diabetes management (Type 2).  Last visit with us was:2018  HPI:  List Current Medications for Glycemic control and the doses: 1- Metformin 500 mg ( 2 tablets in am and 2 tablets in pm)  2- Trulicity 4.5 mg weekly  3-   SMBG (self monitored blood glucose) readings:  - Name of glucometer:  - How often does the patient check BG? once a day  - Does the patient keep a log?   If detailed record is available, what is the range of most of the BG readings? Average B-130  Does patient get Hypoglycemic episodes? None   Diabetic Complications: Is patient aware of having any of those complications? - Eyes: Retinopathy? None, History of cataract surgery   	When was the last fully dilated eye exam? 2023 - Feet: 	Neuropathy? None   	Foot Ulcers? None  	When was the last time patient saw a Podiatrist? Not in a while  - Kidneys: Nephropathy? GFR of 77     Diet: review patient's diet:  Breakfast: Eggs, apple, coffee, sandwich thins, peanut butter Lunch: Bagel, fruit ( 3 times a day), salad, grilled chicken, low fat cream cheese  Dinner: meat, salad, rice ( small portion)  Snacks: Trailmix bar ( night snack), fruit during the day  Juice or soda: None  Dessert: Ice cream  She has been consuming more vegetables   Snacks: Chips, Watermelon, Pineapple, Dole cups, blackberries Cookies   Has increased consumption of vegetables   Exercise: review patient exercise habits: Has been doing the stationary bike x few times per week   Symptoms: Write any symptoms, concerns and issues bothering the patient which have not be addressed above:  No blurry vision, no excessive thirst or urination

## 2023-08-18 LAB — HBA1C MFR BLD HPLC: 7.5

## 2023-09-07 ENCOUNTER — RX RENEWAL (OUTPATIENT)
Age: 68
End: 2023-09-07

## 2023-09-08 RX ORDER — METFORMIN ER 500 MG 500 MG/1
500 TABLET ORAL
Qty: 360 | Refills: 3 | Status: ACTIVE | COMMUNITY
Start: 2017-07-12 | End: 1900-01-01

## 2023-09-11 ENCOUNTER — RX RENEWAL (OUTPATIENT)
Age: 68
End: 2023-09-11

## 2023-12-19 ENCOUNTER — APPOINTMENT (OUTPATIENT)
Dept: ENDOCRINOLOGY | Facility: CLINIC | Age: 68
End: 2023-12-19
Payer: MEDICARE

## 2023-12-19 VITALS
BODY MASS INDEX: 36.88 KG/M2 | WEIGHT: 235 LBS | SYSTOLIC BLOOD PRESSURE: 130 MMHG | OXYGEN SATURATION: 98 % | DIASTOLIC BLOOD PRESSURE: 80 MMHG | HEART RATE: 84 BPM | HEIGHT: 67 IN

## 2023-12-19 PROCEDURE — 99214 OFFICE O/P EST MOD 30 MIN: CPT | Mod: 25

## 2023-12-19 NOTE — PHYSICAL EXAM
[Alert] : alert [No Acute Distress] : no acute distress [Normal Sclera/Conjunctiva] : normal sclera/conjunctiva [PERRL] : pupils equal, round and reactive to light [Normal Outer Ear/Nose] : the ears and nose were normal in appearance [Normal TMs] : both tympanic membranes were normal [No Neck Mass] : no neck mass was observed [Thyroid Not Enlarged] : the thyroid was not enlarged [No Respiratory Distress] : no respiratory distress [Clear to Auscultation] : lungs were clear to auscultation bilaterally [Normal S1, S2] : normal S1 and S2 [Normal Rate] : heart rate was normal [Regular Rhythm] : with a regular rhythm [Normal Bowel Sounds] : normal bowel sounds [Not Tender] : non-tender [Soft] : abdomen soft [Normal Gait] : normal gait [No Joint Swelling] : no joint swelling seen [Oriented x3] : oriented to person, place, and time [Normal Insight/Judgement] : insight and judgment were intact

## 2023-12-19 NOTE — HISTORY OF PRESENT ILLNESS
[FreeTextEntry1] : 67-year-old female here for follow up with Type 2 diabetes.  She was diagnosed in   She reported that she has been having Alopecia. and now on a steroid ointment    New PCP: Dr. Tasha Em  Diabetes Follow-up HPI  CC Patient is here for f/u of diabetes management (Type 2).  Last visit with us was:2018  HPI:  List Current Medications for Glycemic control and the doses: 1- Metformin 500 mg ( 2 tablets in am and 2 tablets in pm)  2- Trulicity 4.5 mg weekly  3-   SMBG (self monitored blood glucose) readings:  - Name of glucometer:  - How often does the patient check BG? once a day  - Does the patient keep a log?   If detailed record is available, what is the range of most of the BG readings? Average B-120  Does patient get Hypoglycemic episodes? once at 73 ( however it was after a prolonged fasting period)  Diabetic Complications: Is patient aware of having any of those complications? - Eyes: Retinopathy? None, History of cataract surgery   	When was the last fully dilated eye exam? 2023 - Feet: 	Neuropathy? None   	Foot Ulcers? None  	When was the last time patient saw a Podiatrist? Not in a while  - Kidneys: Nephropathy? GFR of 77     Diet: review patient's diet:  Breakfast: Eggs, apple, coffee, sandwich thins, peanut butter Lunch: Bagel, fruit ( 3 times a day), salad, grilled chicken, low fat cream cheese  Dinner: meat, salad, rice ( small portion)  Snacks: Trailmix bar ( night snack), fruit during the day  Juice or soda: None  Dessert: Ice cream  She has been consuming more vegetables   Snacks: Chips, Watermelon, Pineapple, Dole cups, blackberries Cookies   Has increased consumption of vegetables   Exercise: review patient exercise habits: Has been doing the stationary bike x few times per week   Symptoms: Write any symptoms, concerns and issues bothering the patient which have not be addressed above:  No blurry vision, no excessive thirst or urination

## 2023-12-19 NOTE — ASSESSMENT
[FreeTextEntry1] :     68-year-old female with relatively well controlled type 2 diabetes and obesity  Type 2 diabetes: Hga1c is now 7%  -She can continue Metformin 1000 mg twice a day ER -Continue trulicity to 4.5 mg weekly -Applauded for her efforts with her diet and lifestyle -Carb consistent diet -SMBGs 2-3 times per day  HLD -LDL at goal previously -Will check lipid panel today  Obesity -Continue Trulicity to 4.5 mg weekly   Follow up in 4 months.   [facility] Pregnancy And Lactation Text: This medication is Pregnancy Category C and it isn't known if it is safe during pregnancy. It is unknown if it is excreted in breast milk.

## 2023-12-19 NOTE — REVIEW OF SYSTEMS
[Fatigue] : no fatigue [Decreased Appetite] : appetite not decreased [Recent Weight Gain (___ Lbs)] : no recent weight gain [Recent Weight Loss (___ Lbs)] : no recent weight loss [Visual Field Defect] : no visual field defect [Dry Eyes] : no dryness [Dysphagia] : no dysphagia [Dysphonia] : no dysphonia [Chest Pain] : no chest pain [Palpitations] : no palpitations [Nausea] : no nausea [Constipation] : no constipation [Diarrhea] : no diarrhea [Polyuria] : no polyuria [Irregular Menses] : regular menses [Joint Pain] : no joint pain [Muscle Weakness] : no muscle weakness [Acanthosis] : no acanthosis  [Acne] : no acne [Headaches] : no headaches [Tremors] : no tremors [Depression] : no depression [Suicidal Ideation] : no suicidal ideation [Polydipsia] : no polydipsia [Cold Intolerance] : no cold intolerance [Easy Bleeding] : no ~M tendency for easy bleeding [Easy Bruising] : no tendency for easy bruising

## 2023-12-19 NOTE — ASSESSMENT
[FreeTextEntry1] :     68-year-old female with relatively well controlled type 2 diabetes and obesity  Type 2 diabetes: Hga1c is now 7%  -She can continue Metformin 1000 mg twice a day ER -Continue trulicity to 4.5 mg weekly -Applauded for her efforts with her diet and lifestyle -Carb consistent diet -SMBGs 2-3 times per day  HLD -LDL at goal previously -Will check lipid panel today  Obesity -Continue Trulicity to 4.5 mg weekly   Follow up in 4 months.

## 2023-12-20 LAB
ALBUMIN SERPL ELPH-MCNC: 5.3 G/DL
ALP BLD-CCNC: 56 U/L
ALT SERPL-CCNC: 16 U/L
ANION GAP SERPL CALC-SCNC: 15 MMOL/L
AST SERPL-CCNC: 14 U/L
BILIRUB SERPL-MCNC: 0.3 MG/DL
BUN SERPL-MCNC: 18 MG/DL
CALCIUM SERPL-MCNC: 10.4 MG/DL
CHLORIDE SERPL-SCNC: 104 MMOL/L
CHOLEST SERPL-MCNC: 149 MG/DL
CO2 SERPL-SCNC: 21 MMOL/L
CREAT SERPL-MCNC: 0.97 MG/DL
CREAT SPEC-SCNC: 120 MG/DL
EGFR: 63 ML/MIN/1.73M2
GLUCOSE SERPL-MCNC: 122 MG/DL
HDLC SERPL-MCNC: 61 MG/DL
LDLC SERPL CALC-MCNC: 71 MG/DL
MICROALBUMIN 24H UR DL<=1MG/L-MCNC: <1.2 MG/DL
MICROALBUMIN/CREAT 24H UR-RTO: NORMAL MG/G
NONHDLC SERPL-MCNC: 88 MG/DL
POTASSIUM SERPL-SCNC: 4.3 MMOL/L
PROT SERPL-MCNC: 8 G/DL
SODIUM SERPL-SCNC: 140 MMOL/L
T4 FREE SERPL-MCNC: 1.6 NG/DL
TRIGL SERPL-MCNC: 93 MG/DL
TSH SERPL-ACNC: 0.91 UIU/ML

## 2024-01-23 ENCOUNTER — RX RENEWAL (OUTPATIENT)
Age: 69
End: 2024-01-23

## 2024-06-10 ENCOUNTER — APPOINTMENT (OUTPATIENT)
Dept: ENDOCRINOLOGY | Facility: CLINIC | Age: 69
End: 2024-06-10
Payer: MEDICARE

## 2024-06-10 VITALS
TEMPERATURE: 98.1 F | WEIGHT: 240 LBS | SYSTOLIC BLOOD PRESSURE: 150 MMHG | DIASTOLIC BLOOD PRESSURE: 85 MMHG | HEIGHT: 67 IN | HEART RATE: 82 BPM | OXYGEN SATURATION: 98 % | BODY MASS INDEX: 37.67 KG/M2

## 2024-06-10 LAB
GLUCOSE BLDC GLUCOMTR-MCNC: 437
HBA1C MFR BLD HPLC: 12

## 2024-06-10 PROCEDURE — 99215 OFFICE O/P EST HI 40 MIN: CPT

## 2024-06-10 PROCEDURE — G2211 COMPLEX E/M VISIT ADD ON: CPT

## 2024-06-10 RX ORDER — EMPAGLIFLOZIN 10 MG/1
10 TABLET, FILM COATED ORAL DAILY
Qty: 1 | Refills: 2 | Status: ACTIVE | COMMUNITY
Start: 2024-06-10 | End: 1900-01-01

## 2024-06-10 NOTE — HISTORY OF PRESENT ILLNESS
[FreeTextEntry1] : 68-year-old female here for follow up with Type 2 diabetes.   Patient was not able to get trulicity 4.5mg and now taking 3.0mg weekly at home Took prednisone 10mg x 5 days - last dose was 3 days ago   She was diagnosed in  Diabetes Follow-up HPI  CC Patient is here for f/u of diabetes management (Type 2).  HPI:  List Current Medications for Glycemic control and the doses: 1- Metformin 1000 mg BID ( 2 tablets in am and 2 tablets in pm)  2- Trulicity 4.5 mg weekly-->3.0mg in the last two months   3-   patient previously tried ozempic- had bad GI side effects tried rybelsus- switched to trulicity   SMBG (self monitored blood glucose) readings:  If detailed record is available, what is the range of most of the BG readings? Average B-400s HgbA1c is 12% today   Does patient get Hypoglycemic episodes? denies   Diabetic Complications: Is patient aware of having any of those complications? - Eyes: Retinopathy? Denies   	When was the last fully dilated eye exam? UTD - Feet: 	Neuropathy? None   	Foot Ulcers? None  	When was the last time patient saw a Podiatrist? Due.  - Kidneys: Nephropathy? GFR of 77   Diet: review patient's diet:  Breakfast: Cottage cheese with fruit, bagel with cream cheese, half a roll with cheese and turkey breast  Lunch: salad - roberta salad and 647 bread  Dinner: vegetables and protein  Snacks: fruit, peaches  Juice or soda: Denies. occasional diet ginger ale    Exercise: yes, going to the gym    #VItamin D deficiency  - 5000 IU daily   #HLD - patient is on fenofibrate 160mg  - LDL is 71

## 2024-06-10 NOTE — ASSESSMENT
[FreeTextEntry1] :     68-year-old female with relatively well controlled type 2 diabetes and obesity  #Type 2 DM -Patient with longstanding history of type 2 diabetes.  Previously was well-controlled however over the last 2 months, patient was unable to get her Trulicity 4.5 mg and has been taking 3.0 mg that she had leftover from prior.  -She also had upper respiratory infection last week and was taking prednisone 10 mg daily for 5 days and just finished her last dose on Friday. -Sugars have been running significantly high in the 300s to 400s -Hemoglobin A1c is 12% Plan: -Recommend for patient to call different pharmacies to find Trulicity 4.5 mg weekly.  Patient will let us know when she finds a pharmacy so that we can send the medication over to the appropriate pharmacy.  If patient cannot find a pharmacy with Trulicity 4.5 mg, she will call us and let us know so that we can find an alternative -In the meantime, we will also start patient on Jardiance 10 mg daily -She can continue Metformin 1000 mg twice a day ER -Patient to return to the office in 1 month with NP to discuss blood sugars.  Return to office in 4 months with myself -Continue with diet and exercise. -Will do blood work today  Side effects of SGLT2 inhibitors discussed in detail including genitourinary infections including vaginal infections, increased urination, risk for dehydration and hypotension.  Recommended to increase patient's fluid intake to avoid dehydration.  Rare side effects of normoglycemic DKA were also discussed.  Side effects of GLP-1 agonist were discussed including most frequent side effects of GI issues such as nausea, diarrhea, vomiting, constipation, abdominal pain, dyspepsia and rare cases of gastric paralysis.  Other adverse effects including hypoglycemia, hypersensitivity, prerenal acute kidney injury, injection site reactions including increased heart rate and pancreatitis were discussed.  Patient denies any previous history of pancreatitis.  Explained to patient that there has been studies in rats that have shown increased risk of medullary thyroid cancer.  Patient denies any history of medullary thyroid cancer in himself or in first-degree family members.  #HLD -LDL at goal previously -Will check lipid panel today  #Obesity -Continue Trulicity to 4.5 mg weekly  #Vitamin D deficiency - continue with vitamin D supplementation - Will check level today

## 2024-06-12 ENCOUNTER — NON-APPOINTMENT (OUTPATIENT)
Age: 69
End: 2024-06-12

## 2024-06-12 LAB
25(OH)D3 SERPL-MCNC: 36.2 NG/ML
ALBUMIN SERPL ELPH-MCNC: 4.7 G/DL
ALP BLD-CCNC: 80 U/L
ALT SERPL-CCNC: 21 U/L
ANION GAP SERPL CALC-SCNC: 16 MMOL/L
AST SERPL-CCNC: 21 U/L
BILIRUB SERPL-MCNC: 0.4 MG/DL
BUN SERPL-MCNC: 15 MG/DL
CALCIUM SERPL-MCNC: 10.4 MG/DL
CHLORIDE SERPL-SCNC: 98 MMOL/L
CHOLEST SERPL-MCNC: 188 MG/DL
CO2 SERPL-SCNC: 21 MMOL/L
CREAT SERPL-MCNC: 0.88 MG/DL
CREAT SPEC-SCNC: 75 MG/DL
EGFR: 71 ML/MIN/1.73M2
GLUCOSE SERPL-MCNC: 484 MG/DL
HDLC SERPL-MCNC: 37 MG/DL
LDLC SERPL CALC-MCNC: 63 MG/DL
MICROALBUMIN 24H UR DL<=1MG/L-MCNC: <1.2 MG/DL
MICROALBUMIN/CREAT 24H UR-RTO: NORMAL MG/G
NONHDLC SERPL-MCNC: 151 MG/DL
POTASSIUM SERPL-SCNC: 4.4 MMOL/L
PROT SERPL-MCNC: 7.7 G/DL
SODIUM SERPL-SCNC: 134 MMOL/L
TRIGL SERPL-MCNC: 584 MG/DL
TSH SERPL-ACNC: 0.82 UIU/ML

## 2024-06-19 RX ORDER — DULAGLUTIDE 4.5 MG/.5ML
4.5 INJECTION, SOLUTION SUBCUTANEOUS
Qty: 1 | Refills: 2 | Status: ACTIVE | COMMUNITY
Start: 2023-04-07 | End: 1900-01-01

## 2024-07-02 ENCOUNTER — APPOINTMENT (OUTPATIENT)
Dept: ENDOCRINOLOGY | Facility: CLINIC | Age: 69
End: 2024-07-02
Payer: MEDICARE

## 2024-07-02 VITALS
SYSTOLIC BLOOD PRESSURE: 133 MMHG | HEIGHT: 67 IN | OXYGEN SATURATION: 97 % | HEART RATE: 84 BPM | BODY MASS INDEX: 35.63 KG/M2 | WEIGHT: 227 LBS | RESPIRATION RATE: 18 BRPM | DIASTOLIC BLOOD PRESSURE: 86 MMHG

## 2024-07-02 DIAGNOSIS — E66.9 OBESITY, UNSPECIFIED: ICD-10-CM

## 2024-07-02 DIAGNOSIS — E11.9 TYPE 2 DIABETES MELLITUS W/OUT COMPLICATIONS: ICD-10-CM

## 2024-07-02 DIAGNOSIS — E78.00 PURE HYPERCHOLESTEROLEMIA, UNSPECIFIED: ICD-10-CM

## 2024-07-02 DIAGNOSIS — E56.9 VITAMIN DEFICIENCY, UNSPECIFIED: ICD-10-CM

## 2024-07-02 LAB — GLUCOSE BLDC GLUCOMTR-MCNC: 275

## 2024-07-02 PROCEDURE — 99214 OFFICE O/P EST MOD 30 MIN: CPT

## 2024-07-02 PROCEDURE — 82962 GLUCOSE BLOOD TEST: CPT

## 2024-07-02 PROCEDURE — G2211 COMPLEX E/M VISIT ADD ON: CPT

## 2024-08-28 ENCOUNTER — RX RENEWAL (OUTPATIENT)
Age: 69
End: 2024-08-28

## 2024-10-14 ENCOUNTER — APPOINTMENT (OUTPATIENT)
Dept: ENDOCRINOLOGY | Facility: CLINIC | Age: 69
End: 2024-10-14
Payer: MEDICARE

## 2024-10-14 VITALS
BODY MASS INDEX: 36.73 KG/M2 | OXYGEN SATURATION: 97 % | WEIGHT: 234 LBS | HEART RATE: 80 BPM | DIASTOLIC BLOOD PRESSURE: 76 MMHG | HEIGHT: 67 IN | SYSTOLIC BLOOD PRESSURE: 146 MMHG

## 2024-10-14 DIAGNOSIS — E78.00 PURE HYPERCHOLESTEROLEMIA, UNSPECIFIED: ICD-10-CM

## 2024-10-14 DIAGNOSIS — E56.9 VITAMIN DEFICIENCY, UNSPECIFIED: ICD-10-CM

## 2024-10-14 DIAGNOSIS — E66.9 OBESITY, UNSPECIFIED: ICD-10-CM

## 2024-10-14 PROCEDURE — 99214 OFFICE O/P EST MOD 30 MIN: CPT

## 2024-10-14 PROCEDURE — G2211 COMPLEX E/M VISIT ADD ON: CPT

## 2024-10-14 PROCEDURE — 82962 GLUCOSE BLOOD TEST: CPT

## 2024-10-14 PROCEDURE — 83036 HEMOGLOBIN GLYCOSYLATED A1C: CPT | Mod: QW

## 2024-10-15 LAB
GLUCOSE BLDC GLUCOMTR-MCNC: 143
HBA1C MFR BLD HPLC: 8

## 2024-10-25 ENCOUNTER — APPOINTMENT (OUTPATIENT)
Dept: ENDOCRINOLOGY | Facility: CLINIC | Age: 69
End: 2024-10-25
Payer: MEDICARE

## 2024-10-25 DIAGNOSIS — E11.9 TYPE 2 DIABETES MELLITUS W/OUT COMPLICATIONS: ICD-10-CM

## 2024-10-25 PROCEDURE — G0108 DIAB MANAGE TRN  PER INDIV: CPT

## 2024-12-28 ENCOUNTER — RX RENEWAL (OUTPATIENT)
Age: 69
End: 2024-12-28

## 2025-01-17 ENCOUNTER — APPOINTMENT (OUTPATIENT)
Dept: ENDOCRINOLOGY | Facility: CLINIC | Age: 70
End: 2025-01-17

## 2025-03-19 RX ORDER — DULAGLUTIDE 4.5 MG/.5ML
4.5 INJECTION, SOLUTION SUBCUTANEOUS
Qty: 3 | Refills: 1 | Status: ACTIVE | COMMUNITY
Start: 2025-03-19 | End: 1900-01-01

## 2025-04-21 ENCOUNTER — APPOINTMENT (OUTPATIENT)
Dept: ENDOCRINOLOGY | Facility: CLINIC | Age: 70
End: 2025-04-21

## 2025-08-15 ENCOUNTER — APPOINTMENT (OUTPATIENT)
Dept: ENDOCRINOLOGY | Facility: CLINIC | Age: 70
End: 2025-08-15
Payer: MEDICARE

## 2025-08-15 VITALS
OXYGEN SATURATION: 97 % | HEIGHT: 68 IN | TEMPERATURE: 98.5 F | DIASTOLIC BLOOD PRESSURE: 90 MMHG | WEIGHT: 233 LBS | HEART RATE: 88 BPM | BODY MASS INDEX: 35.31 KG/M2 | SYSTOLIC BLOOD PRESSURE: 132 MMHG

## 2025-08-15 DIAGNOSIS — E78.00 PURE HYPERCHOLESTEROLEMIA, UNSPECIFIED: ICD-10-CM

## 2025-08-15 DIAGNOSIS — E66.9 OBESITY, UNSPECIFIED: ICD-10-CM

## 2025-08-15 DIAGNOSIS — E11.9 TYPE 2 DIABETES MELLITUS W/OUT COMPLICATIONS: ICD-10-CM

## 2025-08-15 DIAGNOSIS — E56.9 VITAMIN DEFICIENCY, UNSPECIFIED: ICD-10-CM

## 2025-08-15 LAB — HBA1C MFR BLD HPLC: 9.6

## 2025-08-15 PROCEDURE — 99214 OFFICE O/P EST MOD 30 MIN: CPT

## 2025-08-15 PROCEDURE — 83036 HEMOGLOBIN GLYCOSYLATED A1C: CPT | Mod: QW

## 2025-08-15 PROCEDURE — G2211 COMPLEX E/M VISIT ADD ON: CPT

## 2025-08-15 RX ORDER — SEMAGLUTIDE 1.34 MG/ML
4 INJECTION, SOLUTION SUBCUTANEOUS
Qty: 1 | Refills: 2 | Status: ACTIVE | COMMUNITY
Start: 2025-08-15 | End: 1900-01-01